# Patient Record
Sex: MALE | Race: OTHER | NOT HISPANIC OR LATINO
[De-identification: names, ages, dates, MRNs, and addresses within clinical notes are randomized per-mention and may not be internally consistent; named-entity substitution may affect disease eponyms.]

---

## 2018-06-12 ENCOUNTER — OTHER (OUTPATIENT)
Age: 68
End: 2018-06-12

## 2018-06-12 DIAGNOSIS — D64.9 ANEMIA, UNSPECIFIED: ICD-10-CM

## 2018-06-13 ENCOUNTER — OTHER (OUTPATIENT)
Age: 68
End: 2018-06-13

## 2018-06-14 ENCOUNTER — OUTPATIENT (OUTPATIENT)
Dept: OUTPATIENT SERVICES | Facility: HOSPITAL | Age: 68
LOS: 1 days | End: 2018-06-14
Payer: COMMERCIAL

## 2018-06-14 ENCOUNTER — APPOINTMENT (OUTPATIENT)
Dept: GASTROENTEROLOGY | Facility: HOSPITAL | Age: 68
End: 2018-06-14

## 2018-06-14 ENCOUNTER — RESULT REVIEW (OUTPATIENT)
Age: 68
End: 2018-06-14

## 2018-06-14 DIAGNOSIS — R10.9 UNSPECIFIED ABDOMINAL PAIN: ICD-10-CM

## 2018-06-14 DIAGNOSIS — D64.9 ANEMIA, UNSPECIFIED: ICD-10-CM

## 2018-06-14 PROCEDURE — 88305 TISSUE EXAM BY PATHOLOGIST: CPT

## 2018-06-14 PROCEDURE — 88305 TISSUE EXAM BY PATHOLOGIST: CPT | Mod: 26

## 2018-06-14 PROCEDURE — 43239 EGD BIOPSY SINGLE/MULTIPLE: CPT | Mod: GC

## 2018-06-14 PROCEDURE — 43239 EGD BIOPSY SINGLE/MULTIPLE: CPT

## 2018-06-14 PROCEDURE — 45378 DIAGNOSTIC COLONOSCOPY: CPT | Mod: GC

## 2018-06-14 PROCEDURE — 45378 DIAGNOSTIC COLONOSCOPY: CPT

## 2022-06-15 ENCOUNTER — APPOINTMENT (OUTPATIENT)
Dept: FAMILY MEDICINE | Facility: CLINIC | Age: 72
End: 2022-06-15
Payer: MEDICARE

## 2022-06-15 VITALS
SYSTOLIC BLOOD PRESSURE: 134 MMHG | TEMPERATURE: 97 F | WEIGHT: 185 LBS | OXYGEN SATURATION: 95 % | DIASTOLIC BLOOD PRESSURE: 75 MMHG | HEIGHT: 67 IN | BODY MASS INDEX: 29.03 KG/M2 | HEART RATE: 76 BPM

## 2022-06-15 DIAGNOSIS — Z86.39 PERSONAL HISTORY OF OTHER ENDOCRINE, NUTRITIONAL AND METABOLIC DISEASE: ICD-10-CM

## 2022-06-15 DIAGNOSIS — Z82.49 FAMILY HISTORY OF ISCHEMIC HEART DISEASE AND OTHER DISEASES OF THE CIRCULATORY SYSTEM: ICD-10-CM

## 2022-06-15 DIAGNOSIS — I25.10 ATHEROSCLEROTIC HEART DISEASE OF NATIVE CORONARY ARTERY W/OUT ANGINA PECTORIS: ICD-10-CM

## 2022-06-15 DIAGNOSIS — Z86.79 PERSONAL HISTORY OF OTHER DISEASES OF THE CIRCULATORY SYSTEM: ICD-10-CM

## 2022-06-15 DIAGNOSIS — Z82.3 FAMILY HISTORY OF STROKE: ICD-10-CM

## 2022-06-15 DIAGNOSIS — Z87.09 PERSONAL HISTORY OF OTHER DISEASES OF THE RESPIRATORY SYSTEM: ICD-10-CM

## 2022-06-15 PROCEDURE — 99213 OFFICE O/P EST LOW 20 MIN: CPT

## 2022-06-15 RX ORDER — POLYETHYLENE GLYCOL 3350 AND ELECTROLYTES WITH LEMON FLAVOR 236; 22.74; 6.74; 5.86; 2.97 G/4L; G/4L; G/4L; G/4L; G/4L
236 POWDER, FOR SOLUTION ORAL
Qty: 1 | Refills: 0 | Status: DISCONTINUED | COMMUNITY
Start: 2018-06-13 | End: 2022-06-15

## 2022-06-15 RX ORDER — ATORVASTATIN CALCIUM 80 MG/1
80 TABLET, FILM COATED ORAL DAILY
Qty: 90 | Refills: 1 | Status: ACTIVE | COMMUNITY
Start: 2022-06-15 | End: 1900-01-01

## 2022-06-15 RX ORDER — OMEGA-3/DHA/EPA/FISH OIL 300-1000MG
1000 CAPSULE ORAL
Qty: 180 | Refills: 1 | Status: ACTIVE | COMMUNITY
Start: 2022-06-15 | End: 1900-01-01

## 2022-06-16 RX ORDER — CLOPIDOGREL BISULFATE 75 MG/1
75 TABLET, FILM COATED ORAL DAILY
Qty: 90 | Refills: 1 | Status: ACTIVE | COMMUNITY
Start: 2022-06-15 | End: 1900-01-01

## 2022-06-16 RX ORDER — ASPIRIN ENTERIC COATED TABLETS 81 MG 81 MG/1
81 TABLET, DELAYED RELEASE ORAL DAILY
Qty: 90 | Refills: 1 | Status: ACTIVE | COMMUNITY
Start: 2022-06-16 | End: 1900-01-01

## 2022-06-16 RX ORDER — FLUTICASONE FUROATE AND VILANTEROL TRIFENATATE 200; 25 UG/1; UG/1
200-25 POWDER RESPIRATORY (INHALATION) DAILY
Qty: 3 | Refills: 3 | Status: DISCONTINUED | COMMUNITY
Start: 2022-06-15 | End: 2022-06-16

## 2022-06-16 NOTE — REVIEW OF SYSTEMS
[Chest Pain] : chest pain [Negative] : Musculoskeletal [Palpitations] : no palpitations [Claudication] : no  leg claudication [Lower Ext Edema] : no lower extremity edema [Orthopena] : no orthopnea [Paroxysmal Nocturnal Dyspnea] : no paroxysmal nocturnal dyspnea [FreeTextEntry5] : on exertion

## 2022-06-16 NOTE — HISTORY OF PRESENT ILLNESS
[de-identified] : Pt had two stents placed in April and May, 2022. Pt came back for followup. Pt still has left chest pain on exertion. Pt hasn't seen his cardiologist post stent placement. Pt requested all his medications to be renewed.

## 2022-06-29 DIAGNOSIS — R55 SYNCOPE AND COLLAPSE: ICD-10-CM

## 2022-06-29 DIAGNOSIS — J11.1 INFLUENZA DUE TO UNIDENTIFIED INFLUENZA VIRUS WITH OTHER RESPIRATORY MANIFESTATIONS: ICD-10-CM

## 2022-06-29 DIAGNOSIS — Z87.438 PERSONAL HISTORY OF OTHER DISEASES OF MALE GENITAL ORGANS: ICD-10-CM

## 2022-06-29 DIAGNOSIS — Z87.442 PERSONAL HISTORY OF URINARY CALCULI: ICD-10-CM

## 2022-06-29 DIAGNOSIS — Z86.39 PERSONAL HISTORY OF OTHER ENDOCRINE, NUTRITIONAL AND METABOLIC DISEASE: ICD-10-CM

## 2022-06-29 DIAGNOSIS — M79.2 NEURALGIA AND NEURITIS, UNSPECIFIED: ICD-10-CM

## 2022-06-29 DIAGNOSIS — Z87.09 PERSONAL HISTORY OF OTHER DISEASES OF THE RESPIRATORY SYSTEM: ICD-10-CM

## 2022-06-29 DIAGNOSIS — B02.9 ZOSTER W/OUT COMPLICATIONS: ICD-10-CM

## 2022-06-29 DIAGNOSIS — K21.9 GASTRO-ESOPHAGEAL REFLUX DISEASE W/OUT ESOPHAGITIS: ICD-10-CM

## 2022-06-29 DIAGNOSIS — L02.01 CUTANEOUS ABSCESS OF FACE: ICD-10-CM

## 2022-09-20 ENCOUNTER — APPOINTMENT (OUTPATIENT)
Dept: FAMILY MEDICINE | Facility: CLINIC | Age: 72
End: 2022-09-20

## 2022-09-20 VITALS
HEART RATE: 65 BPM | WEIGHT: 185 LBS | TEMPERATURE: 97 F | SYSTOLIC BLOOD PRESSURE: 142 MMHG | DIASTOLIC BLOOD PRESSURE: 76 MMHG | OXYGEN SATURATION: 97 % | BODY MASS INDEX: 28.98 KG/M2

## 2022-09-20 DIAGNOSIS — R51.9 HEADACHE, UNSPECIFIED: ICD-10-CM

## 2022-09-20 PROCEDURE — 90662 IIV NO PRSV INCREASED AG IM: CPT

## 2022-09-20 PROCEDURE — 99213 OFFICE O/P EST LOW 20 MIN: CPT | Mod: 25

## 2022-09-20 PROCEDURE — G0008: CPT

## 2022-09-20 RX ORDER — MONTELUKAST 10 MG/1
10 TABLET, FILM COATED ORAL DAILY
Qty: 90 | Refills: 1 | Status: DISCONTINUED | COMMUNITY
Start: 2022-06-16 | End: 2022-09-20

## 2022-09-20 RX ORDER — BUDESONIDE AND FORMOTEROL FUMARATE DIHYDRATE 160; 4.5 UG/1; UG/1
160-4.5 AEROSOL RESPIRATORY (INHALATION) TWICE DAILY
Qty: 3 | Refills: 1 | Status: DISCONTINUED | COMMUNITY
Start: 2022-06-16 | End: 2022-09-20

## 2022-09-20 RX ORDER — ALBUTEROL SULFATE 90 UG/1
108 (90 BASE) AEROSOL, METERED RESPIRATORY (INHALATION)
Refills: 0 | Status: COMPLETED | COMMUNITY
End: 2022-09-20

## 2022-09-20 NOTE — HISTORY OF PRESENT ILLNESS
[de-identified] : Pt came back for q 3 months follow up. Pt has been taking all his medications, needs Rxs for all medications and blood tests today.\par Pt has chronic COPD, CAD, hypertension, hyperlipidemia, iron deficiency anemia and vit D deficiency. Pt fell in 2016 and had head concussion. Ever since pt has been having chronic dizziness and headache. Pt has been followed with neurologist and had two CT of brain. Last CT of brain was in 2020, no significant changes. Pt wants to see another neurologist and pulmonologist because he is dissatisfied with his current ones. Pt still has sob on exertion, seen by cardiologist and pulmonologist recently, no change of medications.

## 2022-09-20 NOTE — REVIEW OF SYSTEMS
[Negative] : Heme/Lymph [Dyspnea on Exertion] : dyspnea on exertion [Headache] : headache [Dizziness] : dizziness [Unsteady Walk] : ataxia [Wheezing] : no wheezing [Cough] : no cough [Fainting] : no fainting [Confusion] : no confusion

## 2022-09-28 ENCOUNTER — APPOINTMENT (OUTPATIENT)
Dept: FAMILY MEDICINE | Facility: CLINIC | Age: 72
End: 2022-09-28

## 2022-09-28 ENCOUNTER — LABORATORY RESULT (OUTPATIENT)
Age: 72
End: 2022-09-28

## 2022-09-28 PROCEDURE — 36415 COLL VENOUS BLD VENIPUNCTURE: CPT

## 2022-09-29 LAB
25(OH)D3 SERPL-MCNC: 64.6 NG/ML
ALBUMIN SERPL ELPH-MCNC: 4.9 G/DL
ALP BLD-CCNC: 81 U/L
ALT SERPL-CCNC: 12 U/L
ANION GAP SERPL CALC-SCNC: 16 MMOL/L
APPEARANCE: CLEAR
AST SERPL-CCNC: 20 U/L
BASOPHILS # BLD AUTO: 0.07 K/UL
BASOPHILS NFR BLD AUTO: 1 %
BILIRUB SERPL-MCNC: 0.6 MG/DL
BILIRUBIN URINE: NEGATIVE
BLOOD URINE: NEGATIVE
BUN SERPL-MCNC: 11 MG/DL
CALCIUM SERPL-MCNC: 9.6 MG/DL
CHLORIDE SERPL-SCNC: 103 MMOL/L
CHOLEST SERPL-MCNC: 139 MG/DL
CO2 SERPL-SCNC: 22 MMOL/L
COLOR: COLORLESS
CREAT SERPL-MCNC: 1.18 MG/DL
EGFR: 66 ML/MIN/1.73M2
EOSINOPHIL # BLD AUTO: 0.22 K/UL
EOSINOPHIL NFR BLD AUTO: 3.3 %
ESTIMATED AVERAGE GLUCOSE: 143 MG/DL
FERRITIN SERPL-MCNC: 17 NG/ML
GLUCOSE QUALITATIVE U: NEGATIVE
GLUCOSE SERPL-MCNC: 87 MG/DL
HBA1C MFR BLD HPLC: 6.6 %
HCT VFR BLD CALC: 44.6 %
HDLC SERPL-MCNC: 41 MG/DL
HGB BLD-MCNC: 14.5 G/DL
IMM GRANULOCYTES NFR BLD AUTO: 0.1 %
IRON SATN MFR SERPL: 14 %
IRON SERPL-MCNC: 61 UG/DL
KETONES URINE: NEGATIVE
LDLC SERPL CALC-MCNC: 69 MG/DL
LEUKOCYTE ESTERASE URINE: ABNORMAL
LYMPHOCYTES # BLD AUTO: 2.74 K/UL
LYMPHOCYTES NFR BLD AUTO: 41 %
MAN DIFF?: NORMAL
MCHC RBC-ENTMCNC: 29.4 PG
MCHC RBC-ENTMCNC: 32.5 GM/DL
MCV RBC AUTO: 90.3 FL
MONOCYTES # BLD AUTO: 0.46 K/UL
MONOCYTES NFR BLD AUTO: 6.9 %
NEUTROPHILS # BLD AUTO: 3.19 K/UL
NEUTROPHILS NFR BLD AUTO: 47.7 %
NITRITE URINE: NEGATIVE
NONHDLC SERPL-MCNC: 98 MG/DL
PH URINE: 6.5
PLATELET # BLD AUTO: 200 K/UL
POTASSIUM SERPL-SCNC: 4.6 MMOL/L
PROT SERPL-MCNC: 7.5 G/DL
PROTEIN URINE: NEGATIVE
PSA SERPL-MCNC: 3.27 NG/ML
RBC # BLD: 4.94 M/UL
RBC # FLD: 14.7 %
SODIUM SERPL-SCNC: 140 MMOL/L
SPECIFIC GRAVITY URINE: 1
TIBC SERPL-MCNC: 449 UG/DL
TRIGL SERPL-MCNC: 141 MG/DL
TSH SERPL-ACNC: 1.6 UIU/ML
UIBC SERPL-MCNC: 388 UG/DL
URATE SERPL-MCNC: 3.4 MG/DL
UROBILINOGEN URINE: NORMAL
WBC # FLD AUTO: 6.69 K/UL

## 2022-10-05 ENCOUNTER — APPOINTMENT (OUTPATIENT)
Dept: FAMILY MEDICINE | Facility: CLINIC | Age: 72
End: 2022-10-05

## 2022-10-05 PROCEDURE — 99441: CPT

## 2022-10-05 NOTE — END OF VISIT
[FreeTextEntry3] : Pt was fully explained his diagnosis, treatment plan and goal of treatment today on the phone for 10 minutes\par

## 2022-10-05 NOTE — HISTORY OF PRESENT ILLNESS
[Home] : at home, [unfilled] , at the time of the visit. [Medical Office: ([facility])___] : at the medical office located in  [Verbal consent obtained from patient] : the patient, [unfilled] [de-identified] : HBA1c was 6.6, estimated glucose 143, ferritin 17, iron total binding capacity 449, iron saturation 14%. Pt was recalled back for abnormal test reports. Reports were reviewed with pt in details. Other blood tests came back wnl. Pt has been taking all his medications.\par \par \par

## 2022-11-29 ENCOUNTER — APPOINTMENT (OUTPATIENT)
Dept: FAMILY MEDICINE | Facility: CLINIC | Age: 72
End: 2022-11-29

## 2022-11-29 VITALS
TEMPERATURE: 98.2 F | WEIGHT: 185 LBS | SYSTOLIC BLOOD PRESSURE: 137 MMHG | BODY MASS INDEX: 29.03 KG/M2 | OXYGEN SATURATION: 99 % | RESPIRATION RATE: 16 BRPM | HEIGHT: 67 IN | DIASTOLIC BLOOD PRESSURE: 81 MMHG | HEART RATE: 66 BPM

## 2022-11-29 DIAGNOSIS — B00.9 HERPESVIRAL INFECTION, UNSPECIFIED: ICD-10-CM

## 2022-11-29 PROCEDURE — 99214 OFFICE O/P EST MOD 30 MIN: CPT

## 2022-11-29 PROCEDURE — 87804 INFLUENZA ASSAY W/OPTIC: CPT | Mod: 59,QW

## 2022-11-29 NOTE — PHYSICAL EXAM
[Normal TMs] : both tympanic membranes were normal [Normal] : no respiratory distress, lungs were clear to auscultation bilaterally and no accessory muscle use [de-identified] : Mild erythematous oropharynx, no significant tonsil enlargement and exudate. Right upper neck: small lymph node, tender. 0.2 cm blister on right side lower lip.

## 2022-11-29 NOTE — HISTORY OF PRESENT ILLNESS
[FreeTextEntry8] : Pt walked in today and complained sore throat, headache, coughing, painful bumps on right upper neck, right ear pain, and blister at lower lip since past 3 days. Pt felt chills too with low grade fever. No vomiting and diarrhea. Pt has COPD and used symbicort 160-4.5, 2 puffs bid. Pt denied wheezing and sob. Pt didn't do home covid test yet. \par

## 2022-11-30 PROBLEM — B00.9 HERPES SIMPLEX TYPE 1 INFECTION: Status: ACTIVE | Noted: 2022-11-30

## 2022-11-30 PROBLEM — B00.9 HERPES SIMPLEX TYPE 1 INFECTION: Status: RESOLVED | Noted: 2022-11-29 | Resolved: 2022-11-30

## 2022-12-02 ENCOUNTER — APPOINTMENT (OUTPATIENT)
Dept: PULMONOLOGY | Facility: CLINIC | Age: 72
End: 2022-12-02
Payer: MEDICARE

## 2022-12-02 VITALS
WEIGHT: 184 LBS | BODY MASS INDEX: 28.88 KG/M2 | OXYGEN SATURATION: 98 % | HEIGHT: 67 IN | HEART RATE: 72 BPM | SYSTOLIC BLOOD PRESSURE: 111 MMHG | TEMPERATURE: 97.6 F | DIASTOLIC BLOOD PRESSURE: 71 MMHG

## 2022-12-02 DIAGNOSIS — Z87.891 PERSONAL HISTORY OF NICOTINE DEPENDENCE: ICD-10-CM

## 2022-12-02 LAB
FLUAV SPEC QL CULT: NEGATIVE
FLUBV AG SPEC QL IA: NEGATIVE

## 2022-12-02 PROCEDURE — 99204 OFFICE O/P NEW MOD 45 MIN: CPT

## 2022-12-02 NOTE — PHYSICAL EXAM
[No Acute Distress] : no acute distress [Normal Oropharynx] : normal oropharynx [Normal Appearance] : normal appearance [No Neck Mass] : no neck mass [No JVD] : no jvd [Normal Rate/Rhythm] : normal rate/rhythm [Normal S1, S2] : normal s1, s2 [No Resp Distress] : no resp distress [Clear to Auscultation Bilaterally] : clear to auscultation bilaterally [No Abnormalities] : no abnormalities [Normal Gait] : normal gait [No Clubbing] : no clubbing [No Edema] : no edema [Normal Color/ Pigmentation] : normal color/ pigmentation [No Focal Deficits] : no focal deficits [Oriented x3] : oriented x3 [Normal Affect] : normal affect

## 2022-12-02 NOTE — HISTORY OF PRESENT ILLNESS
[Former] : former [>= 20 pack years] : >= 20 pack years [Never] : never [TextBox_4] : Referred by PMD Dr. Hillman for increasing dyspnea.\par \par 71yo man former 2ppd smoker (quit ~30yrs ago) w/ PMH sig for extensive CAD (s/p PCI, 5+ stents), COPD, anemia, HTN, and post-nasal drip presenting with c/o progressive GONZALEZ over the past few weeks to months to approx. 1 block that has begun to worry him. Previously was able to walk several blocks without limitation, now feels dyspneic with almost minimal exertion associated with chest tightness that radiates to the left axilla and sometimes his neck and back, that is also sometimes occasionally associated with cough of clear sputa. He sometimes feels chest tightness even at rest, and after showering he is so uncomfortable he has to sit upright and get his bearings. Sx are exacerbated lying flat or on left side. Sometimes wheezes during these episodes but also says he frequently feels dizzy and with palpitations as well when very dyspneic and has described almost "passing out" when they are at their worst. Pt also adds that he sometimes has abd pain or nausea too when he cannot catch his breath. Endorses post-nasal drip as a trigger for coughing, especially when lying down at night but also sometimes during day.\par \par He used to see a pulmonologist and describes having breathing tests done years ago when he was told he had COPD, but has not followed-up in at least 2 years. Cannot remember name of pulmonologist he previously saw. Also follows with cardiologist Dr. Guera Quintanilla ([facility]) but admits he also has not seen her in over a year. His COPD is maintained with Symbicort and PRN albuterol.  [TextBox_11] : 2 [TextBox_13] : 25 [YearQuit] : 1992

## 2022-12-02 NOTE — REVIEW OF SYSTEMS
[Postnasal Drip] : postnasal drip [Cough] : cough [Chest Tightness] : chest tightness [Dyspnea] : dyspnea [SOB on Exertion] : sob on exertion [Chest Discomfort] : chest discomfort [Leg Cramps] : leg cramps [Orthopnea] : orthopnea [Palpitations] : palpitations [Nausea] : nausea [Anemia] : anemia [Negative] : Endocrine [Hemoptysis] : no hemoptysis [Sputum] : no sputum [Vomiting] : no vomiting [TextBox_91] : leg and foot cramping

## 2022-12-02 NOTE — CONSULT LETTER
[Dear  ___] : Dear  [unfilled], [( Thank you for referring [unfilled] for consultation for _____ )] : Thank you for referring [unfilled] for consultation for [unfilled] [Please see my note below.] : Please see my note below. [Consult Closing:] : Thank you very much for allowing me to participate in the care of this patient.  If you have any questions, please do not hesitate to contact me. [Sincerely,] : Sincerely, [DrPérez  ___] : Dr. RODRIGUEZ [FreeTextEntry1] : I [FreeTextEntry3] : Faisal Rocha, DO\par Pulmonary & Critical Care Medicine\par [facility]

## 2022-12-02 NOTE — ASSESSMENT
[FreeTextEntry1] : 73yo man former 2ppd smoker (quit ~30yrs ago) w/ PMH sig for extensive CAD (s/p PCI, 5+ stents), COPD, anemia, HTN, and post-nasal drip presenting with c/o progressive GONZALEZ over the past few weeks to months to approx. 1 block associated w/ radiating chest tightness and occasionally near-syncope. \par \par Data Reviewed:\par Prior cardiology notes (scanned 2/2022)\par PMD notes, recent labs, EKG\par \par Impression:\par #COPD - not likely an exacerbation\par #Post-nasal drip, allergic rhinitis\par #Progressive GONZALEZ associated w/ chest tightness and occasional near-syncopal sx\par #Former smoker\par \par While his documented hx of COPD may play a role in dyspnea/GONZALEZ, it is concerning that his dyspnea is always associated with radiating chest tightness/pain and near-syncope in the context of extensive CAD hx and ACS risk factors, especially when they are relieved with rest/upright position and can be provoked with certain supine positions. Has not had recent pulmonary follow-up since the pandemic, and says he is over due to follow-up with his cardiologist. \par \par Plan:\par - suggested patient schedule follow-up visit to re-establish continued cardiology care for CAD, particularly given possibility he is having exertional angina more and more frequently \par - will obtain full PFT with 6MWT to establish new baseline for airflow limitation and gas exchange \par - recommended he continue using symbicort 2 puffs BID (with mouse rinsing after each dose), and PRN albuterol for when he has wheezing or feels tight - also has nebulizer machine at home he can use\par - will defer initiating Spiriva at this time pending PFT and additional cardiac mgmt as patient expressed concern over out of pocket cost of inhalers, though he may eventually need escalation to triple inhaled therapy which I counseled him about\par - advised he use flonase nasal sprays in each nostril twice a day for at least 2-4 wks for mgmt of post-nasal drip (said he was only using PRN and still had symptoms)\par - OTC cough suppressants as necessary \par - directions provided to [facility] pulmonary office for PFTs, \par - will have office visit note faxed to his cardiologist at pt's request\par \par Return in 6-8wks or after PFT

## 2022-12-30 ENCOUNTER — APPOINTMENT (OUTPATIENT)
Dept: PULMONOLOGY | Facility: CLINIC | Age: 72
End: 2022-12-30
Payer: MEDICARE

## 2022-12-30 VITALS
BODY MASS INDEX: 29.19 KG/M2 | TEMPERATURE: 98 F | DIASTOLIC BLOOD PRESSURE: 77 MMHG | WEIGHT: 186 LBS | OXYGEN SATURATION: 99 % | SYSTOLIC BLOOD PRESSURE: 138 MMHG | HEIGHT: 67 IN | HEART RATE: 70 BPM

## 2022-12-30 PROCEDURE — 94618 PULMONARY STRESS TESTING: CPT

## 2022-12-30 PROCEDURE — 94010 BREATHING CAPACITY TEST: CPT

## 2022-12-30 PROCEDURE — 94726 PLETHYSMOGRAPHY LUNG VOLUMES: CPT

## 2022-12-30 PROCEDURE — 94729 DIFFUSING CAPACITY: CPT

## 2022-12-30 PROCEDURE — ZZZZZ: CPT

## 2023-01-25 ENCOUNTER — NON-APPOINTMENT (OUTPATIENT)
Age: 73
End: 2023-01-25

## 2023-01-25 ENCOUNTER — APPOINTMENT (OUTPATIENT)
Dept: PULMONOLOGY | Facility: CLINIC | Age: 73
End: 2023-01-25

## 2023-01-27 ENCOUNTER — APPOINTMENT (OUTPATIENT)
Dept: PULMONOLOGY | Facility: CLINIC | Age: 73
End: 2023-01-27
Payer: MEDICARE

## 2023-01-27 VITALS
OXYGEN SATURATION: 98 % | BODY MASS INDEX: 29.19 KG/M2 | HEART RATE: 64 BPM | TEMPERATURE: 98.3 F | HEIGHT: 67 IN | RESPIRATION RATE: 15 BRPM | SYSTOLIC BLOOD PRESSURE: 124 MMHG | WEIGHT: 186 LBS | DIASTOLIC BLOOD PRESSURE: 70 MMHG

## 2023-01-27 PROCEDURE — 99215 OFFICE O/P EST HI 40 MIN: CPT

## 2023-01-27 RX ORDER — BUDESONIDE AND FORMOTEROL FUMARATE DIHYDRATE 160; 4.5 UG/1; UG/1
160-4.5 AEROSOL RESPIRATORY (INHALATION) TWICE DAILY
Qty: 1 | Refills: 3 | Status: DISCONTINUED | COMMUNITY
Start: 2022-06-16 | End: 2023-01-27

## 2023-01-27 NOTE — ASSESSMENT
[FreeTextEntry1] : 71yo man former 2ppd smoker (quit ~30yrs ago) w/ PMH sig for extensive CAD (s/p PCI, 5+ stents), COPD, GERD, anemia, HTN, and post-nasal drip presenting with significant coughing spells a/w progressive GONZALEZ over the past few weeks to months to approx. 1 block sometimes associated w/ radiating chest tightness and occasionally near-syncope. \par \par Data Reviewed:\par PFT (12/2022): FEV1 2.65L (106% predicted), FVC 3.44L (103% pred), FEV1/FVC 77%, TLC 93%, RV 78%, DLCO 63% - normal wilma and volumes, mild decr DLCO\par 6MWT (12/2022): 346 meters/360sec, pre SpO2 97% HR 70 138/77; post-SpO2 97%, HR 99 (peak 105), 160/70 no supplemental O2 required, \par Interval cardiology office note (1/17/23)\par TTE (12/2022, via cardiology note): HFpEF w/ EF 50-55%, mild dec ventricular systolic fxn, mild MR/TR, no e/o PH\par \par Impression:\par #COPD (questionable? given normal spirometry) \par #Post-nasal drip, allergic rhinitis\par #Chronic cough w/ associated dyspnea\par #GERD\par \par Pt lacks spirometric evidence of airflow obstruction on PFT, which is unusual if his symptoms were all attributable to underlying COPD. Cannot exclude reactive airways or even situational asthma, though the majority of his symptoms are coughing-mediated with some non-coughing related dyspnea/chest tightness and pre-syncope (when on exertion). Describes significant GERD and PND symptoms that can trigger cough via airways inflammation and warrants aggressive suppressive tx to assess for improvement in chronic cough and by extension, his associated chest tightness/fatigue, before further diagnostic testing. \par \par Plan:\par - will continue Breo trial in place of symbicort for now (switched by cardiology due to symbicort copay) - demonstrated proper Ellipta inhaler technique and education in office today, and provided 1 month sample to help with financial burden of $50 copay\par - reminded pt he must rinse mouth after each Breo inhalation\par - continue PRN albuterol for dyspnea/coughing/chest tightness, and suggested he pre-medicate if he is expecting a known trigger\par - re-instructed him to use flonase nasal sprays in each nostril daily for at least 2-4 wks for mgmt of post-nasal drip (pt still was only using PRN)\par - start pantoprazole daily at bedtime for GERD\par - OTC cough suppressants as necessary \par - will communicate updates with cardiology and appreciate concomitant eval for pre-syncopal sx\par - if coughing improves and still dyspneic on therapy, will consider exercise testing in the future\par \par Return in 3mo

## 2023-01-27 NOTE — REASON FOR VISIT
[Follow-Up] : a follow-up visit [Chest Pain] : chest pain [Cough] : cough [TextBox_13] : Dr. Rafia Hillman

## 2023-01-27 NOTE — HISTORY OF PRESENT ILLNESS
[Former] : former [>= 20 pack years] : >= 20 pack years [Never] : never [TextBox_4] : Referred by PMD Dr. Hillman for increasing dyspnea.\par \par 71yo man former 2ppd smoker (quit ~30yrs ago) w/ PMH sig for extensive CAD (s/p PCI, 5+ stents), COPD, anemia, HTN, and post-nasal drip presenting with c/o progressive GONZALEZ over the past few weeks to months to approx. 1 block that has begun to worry him. Previously was able to walk several blocks without limitation, now feels dyspneic with almost minimal exertion associated with chest tightness that radiates to the left axilla and sometimes his neck and back, that is also sometimes occasionally associated with cough of clear sputa. He sometimes feels chest tightness even at rest, and after showering he is so uncomfortable he has to sit upright and get his bearings. Sx are exacerbated lying flat or on left side. Sometimes wheezes during these episodes but also says he frequently feels dizzy and with palpitations as well when very dyspneic and has described almost "passing out" when they are at their worst. Pt also adds that he sometimes has abd pain or nausea too when he cannot catch his breath. Endorses post-nasal drip as a trigger for coughing, especially when lying down at night but also sometimes during day.\par \par He used to see a pulmonologist and describes having breathing tests done years ago when he was told he had COPD, but has not followed-up in at least 2 years. Cannot remember name of pulmonologist he previously saw. Also follows with cardiologist Dr. Guera Quintanilla ([facility]) but admits he also has not seen her in over a year. His COPD is maintained with Symbicort and PRN albuterol. \par \par [1/27/23]\par Here for follow up of coughing/dyspnea and to go over PFTs/6MWT. Still gets significant coughing spells with various triggers that he cannot always predict, but include laughing, exertion, household cleaning product odors, etc. that he says really set things off leading to some chest tightness and feeling faint. Sometimes gets the chest tightness and dizziness with exertion without coughing. Had seen his cardiologist Dr. Quintanilla in the interim and was changed from Symbicort to Breo due to high copay with the former. Also was given cardiac monitor to eval for occult arrhythmias. Also had f/u TTE done at cardiology appt. Pt otherwise does not feel his symptoms are getting worse, but haven't noticed much improvement yet. He has not started using the Breo but has the inhaler with him in office today for instruction. \par \par He also continues to experience significant reflux symptoms and PND which he definitely associates with coughing. Has not been on acid suppressive therapy a couple of months as he ran out of refills. Was only using nasal spray PRN and not every day for PND symptoms.  [TextBox_11] : 1992 [TextBox_13] : 25 [YearQuit] : 1992

## 2023-04-19 ENCOUNTER — APPOINTMENT (OUTPATIENT)
Dept: PULMONOLOGY | Facility: CLINIC | Age: 73
End: 2023-04-19
Payer: MEDICARE

## 2023-04-19 VITALS
BODY MASS INDEX: 29.51 KG/M2 | WEIGHT: 188 LBS | HEART RATE: 82 BPM | DIASTOLIC BLOOD PRESSURE: 77 MMHG | TEMPERATURE: 97 F | OXYGEN SATURATION: 98 % | HEIGHT: 67 IN | SYSTOLIC BLOOD PRESSURE: 127 MMHG

## 2023-04-19 PROCEDURE — 99214 OFFICE O/P EST MOD 30 MIN: CPT

## 2023-04-19 RX ORDER — ALBUTEROL SULFATE 90 UG/1
108 (90 BASE) INHALANT RESPIRATORY (INHALATION)
Qty: 1 | Refills: 5 | Status: ACTIVE | COMMUNITY
Start: 2022-06-15 | End: 1900-01-01

## 2023-04-19 RX ORDER — FLUTICASONE FUROATE AND VILANTEROL 100; 25 UG/1; UG/1
100-25 POWDER RESPIRATORY (INHALATION)
Qty: 1 | Refills: 5 | Status: ACTIVE | COMMUNITY

## 2023-04-19 NOTE — HISTORY OF PRESENT ILLNESS
[Former] : former [>= 20 pack years] : >= 20 pack years [Never] : never [TextBox_4] : Referred by PMD Dr. Hillman for increasing dyspnea.\par \par 73yo man former 2ppd smoker (quit ~30yrs ago) w/ PMH sig for extensive CAD (s/p PCI, 5+ stents), COPD, anemia, HTN, and post-nasal drip presenting with c/o progressive GONZALEZ over the past few weeks to months to approx. 1 block that has begun to worry him. Previously was able to walk several blocks without limitation, now feels dyspneic with almost minimal exertion associated with chest tightness that radiates to the left axilla and sometimes his neck and back, that is also sometimes occasionally associated with cough of clear sputa. He sometimes feels chest tightness even at rest, and after showering he is so uncomfortable he has to sit upright and get his bearings. Sx are exacerbated lying flat or on left side. Sometimes wheezes during these episodes but also says he frequently feels dizzy and with palpitations as well when very dyspneic and has described almost "passing out" when they are at their worst. Pt also adds that he sometimes has abd pain or nausea too when he cannot catch his breath. Endorses post-nasal drip as a trigger for coughing, especially when lying down at night but also sometimes during day.\par \par He used to see a pulmonologist and describes having breathing tests done years ago when he was told he had COPD, but has not followed-up in at least 2 years. Cannot remember name of pulmonologist he previously saw. Also follows with cardiologist Dr. Guera Quintanilla ([facility]) but admits he also has not seen her in over a year. His COPD is maintained with Symbicort and PRN albuterol. \par \par [1/27/23]\par Here for follow up of coughing/dyspnea and to go over PFTs/6MWT. Still gets significant coughing spells with various triggers that he cannot always predict, but include laughing, exertion, household cleaning product odors, etc. that he says really set things off leading to some chest tightness and feeling faint. Sometimes gets the chest tightness and dizziness with exertion without coughing. Had seen his cardiologist Dr. Quintanilla in the interim and was changed from Symbicort to Breo due to high copay with the former. Also was given cardiac monitor to eval for occult arrhythmias. Also had f/u TTE done at cardiology appt. Pt otherwise does not feel his symptoms are getting worse, but haven't noticed much improvement yet. He has not started using the Breo but has the inhaler with him in office today for instruction. \par \par He also continues to experience significant reflux symptoms and PND which he definitely associates with coughing. Has not been on acid suppressive therapy a couple of months as he ran out of refills. Was only using nasal spray PRN and not every day for PND symptoms. \par \par [4/19/23]\par Mr. Singh here for 3mo f/u. Over last couple of weeks been coughing more and feeling a bit more GONZALEZ -- had been doing well otherwise since last visit up until recently. Cough became productive of thick, yellowish phlegm last few days. Also has felt some fatigue. No F/C, no CP or palpitations, no wheezing. Main complaint is actually acid reflux. Says he coughs more at night when he gets reflux symptoms with heartburn up to pharnygeal area. This has been worse since he ran out of PPI for GERD and said there was an issue with his pharmacy not approving refills, asks if he can have new rx today. Has been using Breo as directed daily and PRN albuterol here and there. Also feels the nasal sprays have been helpful with PND. Saw cardiologist about a month or two ago and said some of his heart medications were adjusted. Says he is due to f/u with his primary doctor, Dr. Hillman.  [TextBox_11] : 1992 [TextBox_13] : 25 [YearQuit] : 1992

## 2023-04-19 NOTE — ASSESSMENT
[FreeTextEntry1] : 73yo man former 2ppd smoker (quit ~30yrs ago) w/ PMH sig for extensive CAD (s/p PCI, 5+ stents), COPD, GERD, anemia, HTN, and post-nasal drip presenting with significant coughing spells a/w progressive GONZALEZ over the past few weeks to months to approx. 1 block sometimes associated w/ radiating chest tightness and occasionally near-syncope. \par \par Data Reviewed:\par Medication reconciliation\par PFT/6MWT data 12/30\par \par Impression:\par #COPD (questionable? given normal spirometry, vs. stage 0) vs. ACOS\par #Post-nasal drip, allergic rhinitis\par #Acute on chronic cough w/ associated dyspnea\par #GERD\par \par Pt lacks spirometric evidence of airflow obstruction on PFT, which is unusual if his symptoms were all attributable to underlying COPD. Cannot exclude reactive airways or even situational asthma, though the majority of his symptoms are coughing-mediated with some non-coughing related dyspnea/chest tightness and pre-syncope (when on exertion). Describes significant GERD and PND symptoms that can trigger cough via airways inflammation and warrants aggressive suppressive tx - when on therapy his symptoms were improved but more recently (off PPI, for example) his reflux sx and nocturnal cough got worse. May have an element of viral bronchitis recently in addition to his background issues. \par \par Plan:\par - continue Breo for now as pt feels it has been helpful; renewed rx and confirmed coverage with pharmacy today\par - reminded pt he must rinse mouth after each Breo inhalation\par - continue PRN albuterol for dyspnea/coughing/chest tightness, and suggested he pre-medicate if he is expecting a known trigger or in the acute period with suspected viral cold\par - cont flonase for PND\par - renewed rx for pantoprazole for his GERD sx, but also advised pt he must f/u with his PMD for long term mgmt of what sounds like more severe reflux sx, may need to see GI again for surveillance endoscopy given his hx\par \par RTO 3-6mos based on progress

## 2023-04-19 NOTE — DISCUSSION/SUMMARY
[FreeTextEntry1] : PFT (12/2022): FEV1 2.65L (106% predicted), FVC 3.44L (103% pred), FEV1/FVC 77%, TLC 93%, RV 78%, DLCO 63% - normal wilma and volumes, mild decr DLCO\par \par 6MWT (12/2022): 346 meters/360sec, pre SpO2 97% HR 70 138/77; post-SpO2 97%, HR 99 (peak 105), 160/70 no supplemental O2 required\par \par TTE (12/2022, via cardiology note): HFpEF w/ EF 50-55%, mild dec ventricular systolic fxn, mild MR/TR, no e/o PH

## 2023-04-27 ENCOUNTER — NON-APPOINTMENT (OUTPATIENT)
Age: 73
End: 2023-04-27

## 2023-04-27 ENCOUNTER — APPOINTMENT (OUTPATIENT)
Dept: FAMILY MEDICINE | Facility: CLINIC | Age: 73
End: 2023-04-27
Payer: MEDICARE

## 2023-04-27 VITALS
BODY MASS INDEX: 28.72 KG/M2 | HEART RATE: 69 BPM | SYSTOLIC BLOOD PRESSURE: 126 MMHG | OXYGEN SATURATION: 97 % | WEIGHT: 183 LBS | DIASTOLIC BLOOD PRESSURE: 71 MMHG | HEIGHT: 67 IN | TEMPERATURE: 97.6 F

## 2023-04-27 DIAGNOSIS — M79.609 PAIN IN UNSPECIFIED LIMB: ICD-10-CM

## 2023-04-27 DIAGNOSIS — R20.0 ANESTHESIA OF SKIN: ICD-10-CM

## 2023-04-27 PROCEDURE — 93000 ELECTROCARDIOGRAM COMPLETE: CPT | Mod: 59

## 2023-04-27 PROCEDURE — G0009: CPT

## 2023-04-27 PROCEDURE — 90677 PCV20 VACCINE IM: CPT

## 2023-04-27 PROCEDURE — G0444 DEPRESSION SCREEN ANNUAL: CPT | Mod: 59

## 2023-04-27 PROCEDURE — G0439: CPT

## 2023-04-27 RX ORDER — NITROGLYCERIN 0.4 MG/1
0.4 TABLET SUBLINGUAL
Refills: 0 | Status: DISCONTINUED | COMMUNITY
Start: 2022-04-05 | End: 2023-04-27

## 2023-04-27 NOTE — HEALTH RISK ASSESSMENT
[Former] : Former [1/2 of Days or More (2)] : 5.) Poor appetite or overeating? Half the days or more [Nearly Every Day (3)] : 7.) Trouble concentrating on things, such as reading a newspaper or watching television? Nearly every day [Several Days (1)] : 8.) Moving or speaking so slowly that other people could have noticed, or the opposite, moving or speaking faster than usual? Several days [Not at All (0)] : 9.) Thoughts that you would be off dead or of hurting yourself in some way? Not at all [Moderately Severe] : severity of depression is moderately severe

## 2023-04-28 LAB
25(OH)D3 SERPL-MCNC: 27.2 NG/ML
ALBUMIN SERPL ELPH-MCNC: 4.7 G/DL
ALP BLD-CCNC: 84 U/L
ALT SERPL-CCNC: 12 U/L
ANION GAP SERPL CALC-SCNC: 14 MMOL/L
APPEARANCE: CLEAR
AST SERPL-CCNC: 20 U/L
BASOPHILS # BLD AUTO: 0.04 K/UL
BASOPHILS NFR BLD AUTO: 0.7 %
BILIRUB SERPL-MCNC: 0.8 MG/DL
BILIRUBIN URINE: NEGATIVE
BLOOD URINE: NEGATIVE
BUN SERPL-MCNC: 12 MG/DL
CALCIUM SERPL-MCNC: 9.7 MG/DL
CHLORIDE SERPL-SCNC: 106 MMOL/L
CHOLEST SERPL-MCNC: 129 MG/DL
CO2 SERPL-SCNC: 23 MMOL/L
COLOR: YELLOW
CREAT SERPL-MCNC: 1.13 MG/DL
CREAT SPEC-SCNC: 279 MG/DL
EGFR: 69 ML/MIN/1.73M2
EOSINOPHIL # BLD AUTO: 0.17 K/UL
EOSINOPHIL NFR BLD AUTO: 2.8 %
ESTIMATED AVERAGE GLUCOSE: 140 MG/DL
FERRITIN SERPL-MCNC: 11 NG/ML
GLUCOSE QUALITATIVE U: NEGATIVE MG/DL
GLUCOSE SERPL-MCNC: 81 MG/DL
HBA1C MFR BLD HPLC: 6.5 %
HCT VFR BLD CALC: 41.9 %
HDLC SERPL-MCNC: 42 MG/DL
HGB BLD-MCNC: 13.5 G/DL
IMM GRANULOCYTES NFR BLD AUTO: 0.2 %
IRON SATN MFR SERPL: 14 %
IRON SERPL-MCNC: 61 UG/DL
KETONES URINE: ABNORMAL MG/DL
LDLC SERPL CALC-MCNC: 64 MG/DL
LEUKOCYTE ESTERASE URINE: NEGATIVE
LYMPHOCYTES # BLD AUTO: 2.24 K/UL
LYMPHOCYTES NFR BLD AUTO: 36.4 %
MAN DIFF?: NORMAL
MCHC RBC-ENTMCNC: 27.6 PG
MCHC RBC-ENTMCNC: 32.2 GM/DL
MCV RBC AUTO: 85.7 FL
MICROALBUMIN 24H UR DL<=1MG/L-MCNC: 1.6 MG/DL
MICROALBUMIN/CREAT 24H UR-RTO: 6 MG/G
MONOCYTES # BLD AUTO: 0.5 K/UL
MONOCYTES NFR BLD AUTO: 8.1 %
NEUTROPHILS # BLD AUTO: 3.19 K/UL
NEUTROPHILS NFR BLD AUTO: 51.8 %
NITRITE URINE: NEGATIVE
NONHDLC SERPL-MCNC: 87 MG/DL
PH URINE: 6.5
PLATELET # BLD AUTO: 196 K/UL
POTASSIUM SERPL-SCNC: 4.3 MMOL/L
PROT SERPL-MCNC: 7.3 G/DL
PROTEIN URINE: NEGATIVE MG/DL
RBC # BLD: 4.89 M/UL
RBC # FLD: 15.9 %
SODIUM SERPL-SCNC: 143 MMOL/L
SPECIFIC GRAVITY URINE: 1.02
T3 SERPL-MCNC: 157 NG/DL
T3FREE SERPL-MCNC: 3.84 PG/ML
T4 FREE SERPL-MCNC: 1.2 NG/DL
T4 SERPL-MCNC: 8.8 UG/DL
TIBC SERPL-MCNC: 439 UG/DL
TRIGL SERPL-MCNC: 116 MG/DL
TSH SERPL-ACNC: 1.47 UIU/ML
UIBC SERPL-MCNC: 378 UG/DL
URATE SERPL-MCNC: 3.6 MG/DL
UROBILINOGEN URINE: 0.2 MG/DL
WBC # FLD AUTO: 6.15 K/UL

## 2023-04-28 NOTE — HISTORY OF PRESENT ILLNESS
[de-identified] : Pt came to office for annual physical exam today.\par Pt complained arms and legs numbness with upper arms throbbing pain and calf of legs cramping sometimes. Pt was referred to see neurologist for his dizziness, pt never went to see him.

## 2023-05-31 ENCOUNTER — APPOINTMENT (OUTPATIENT)
Dept: FAMILY MEDICINE | Facility: CLINIC | Age: 73
End: 2023-05-31
Payer: MEDICARE

## 2023-05-31 VITALS
HEIGHT: 67 IN | WEIGHT: 180 LBS | BODY MASS INDEX: 28.25 KG/M2 | SYSTOLIC BLOOD PRESSURE: 143 MMHG | DIASTOLIC BLOOD PRESSURE: 77 MMHG | OXYGEN SATURATION: 97 % | TEMPERATURE: 98.5 F | HEART RATE: 80 BPM

## 2023-05-31 PROCEDURE — 99213 OFFICE O/P EST LOW 20 MIN: CPT | Mod: 25

## 2023-05-31 PROCEDURE — 90750 HZV VACC RECOMBINANT IM: CPT

## 2023-05-31 PROCEDURE — 90471 IMMUNIZATION ADMIN: CPT

## 2023-05-31 NOTE — HISTORY OF PRESENT ILLNESS
[de-identified] : Pt took escitalopram 100 mg daily for a month. Pt came back for follow up. Pt didn't feel any differently, instead of feeling drowsy all the time. Pt is active in his [employer], in charge of a lot of things. Pt has been doing well. Pt didn't want to continue taking escitalopram anymore. \par Pt also came back for herpes zoster vaccine. \par

## 2023-09-29 ENCOUNTER — APPOINTMENT (OUTPATIENT)
Dept: FAMILY MEDICINE | Facility: CLINIC | Age: 73
End: 2023-09-29
Payer: MEDICARE

## 2023-09-29 VITALS
DIASTOLIC BLOOD PRESSURE: 76 MMHG | TEMPERATURE: 97.8 F | HEIGHT: 67 IN | HEART RATE: 69 BPM | BODY MASS INDEX: 28.72 KG/M2 | SYSTOLIC BLOOD PRESSURE: 126 MMHG | WEIGHT: 183 LBS | OXYGEN SATURATION: 98 %

## 2023-09-29 DIAGNOSIS — Z23 ENCOUNTER FOR IMMUNIZATION: ICD-10-CM

## 2023-09-29 DIAGNOSIS — R07.9 CHEST PAIN, UNSPECIFIED: ICD-10-CM

## 2023-09-29 PROCEDURE — 99214 OFFICE O/P EST MOD 30 MIN: CPT | Mod: 25

## 2023-09-29 PROCEDURE — 90662 IIV NO PRSV INCREASED AG IM: CPT

## 2023-09-29 PROCEDURE — G0008: CPT

## 2023-09-29 RX ORDER — AMLODIPINE BESYLATE 10 MG/1
10 TABLET ORAL DAILY
Qty: 90 | Refills: 1 | Status: ACTIVE | COMMUNITY
Start: 2022-06-14 | End: 1900-01-01

## 2023-10-02 ENCOUNTER — APPOINTMENT (OUTPATIENT)
Dept: GASTROENTEROLOGY | Facility: CLINIC | Age: 73
End: 2023-10-02
Payer: MEDICARE

## 2023-10-02 VITALS
WEIGHT: 183 LBS | HEIGHT: 67 IN | TEMPERATURE: 97.7 F | OXYGEN SATURATION: 97 % | DIASTOLIC BLOOD PRESSURE: 74 MMHG | SYSTOLIC BLOOD PRESSURE: 123 MMHG | HEART RATE: 67 BPM | BODY MASS INDEX: 28.72 KG/M2

## 2023-10-02 DIAGNOSIS — K62.5 HEMORRHAGE OF ANUS AND RECTUM: ICD-10-CM

## 2023-10-02 DIAGNOSIS — R10.84 GENERALIZED ABDOMINAL PAIN: ICD-10-CM

## 2023-10-02 DIAGNOSIS — R19.8 OTHER SPECIFIED SYMPTOMS AND SIGNS INVOLVING THE DIGESTIVE SYSTEM AND ABDOMEN: ICD-10-CM

## 2023-10-02 PROCEDURE — 99204 OFFICE O/P NEW MOD 45 MIN: CPT

## 2023-10-02 RX ORDER — HYDROCORTISONE 25 MG/G
2.5 CREAM TOPICAL
Qty: 1 | Refills: 3 | Status: ACTIVE | COMMUNITY
Start: 2023-10-02 | End: 1900-01-01

## 2023-10-02 RX ORDER — HYDROCORTISONE ACETATE 25 MG/1
25 SUPPOSITORY RECTAL
Qty: 30 | Refills: 3 | Status: ACTIVE | COMMUNITY
Start: 2023-10-02 | End: 1900-01-01

## 2023-12-21 ENCOUNTER — RX RENEWAL (OUTPATIENT)
Age: 73
End: 2023-12-21

## 2024-02-06 ENCOUNTER — TRANSCRIPTION ENCOUNTER (OUTPATIENT)
Age: 74
End: 2024-02-06

## 2024-02-06 ENCOUNTER — OUTPATIENT (OUTPATIENT)
Dept: OUTPATIENT SERVICES | Facility: HOSPITAL | Age: 74
LOS: 1 days | End: 2024-02-06
Payer: MEDICARE

## 2024-02-06 ENCOUNTER — RESULT REVIEW (OUTPATIENT)
Age: 74
End: 2024-02-06

## 2024-02-06 ENCOUNTER — APPOINTMENT (OUTPATIENT)
Dept: GASTROENTEROLOGY | Facility: HOSPITAL | Age: 74
End: 2024-02-06

## 2024-02-06 VITALS
RESPIRATION RATE: 18 BRPM | HEART RATE: 77 BPM | HEIGHT: 67 IN | WEIGHT: 184.97 LBS | SYSTOLIC BLOOD PRESSURE: 144 MMHG | DIASTOLIC BLOOD PRESSURE: 89 MMHG | OXYGEN SATURATION: 99 % | TEMPERATURE: 98 F

## 2024-02-06 VITALS
DIASTOLIC BLOOD PRESSURE: 84 MMHG | SYSTOLIC BLOOD PRESSURE: 130 MMHG | RESPIRATION RATE: 14 BRPM | HEART RATE: 73 BPM | OXYGEN SATURATION: 98 %

## 2024-02-06 DIAGNOSIS — K21.9 GASTRO-ESOPHAGEAL REFLUX DISEASE WITHOUT ESOPHAGITIS: ICD-10-CM

## 2024-02-06 DIAGNOSIS — Z98.890 OTHER SPECIFIED POSTPROCEDURAL STATES: Chronic | ICD-10-CM

## 2024-02-06 DIAGNOSIS — R19.8 OTHER SPECIFIED SYMPTOMS AND SIGNS INVOLVING THE DIGESTIVE SYSTEM AND ABDOMEN: ICD-10-CM

## 2024-02-06 DIAGNOSIS — Z95.1 PRESENCE OF AORTOCORONARY BYPASS GRAFT: Chronic | ICD-10-CM

## 2024-02-06 PROCEDURE — 45380 COLONOSCOPY AND BIOPSY: CPT | Mod: PT

## 2024-02-06 PROCEDURE — 43239 EGD BIOPSY SINGLE/MULTIPLE: CPT

## 2024-02-06 PROCEDURE — 88305 TISSUE EXAM BY PATHOLOGIST: CPT

## 2024-02-06 PROCEDURE — 88312 SPECIAL STAINS GROUP 1: CPT

## 2024-02-06 PROCEDURE — 88305 TISSUE EXAM BY PATHOLOGIST: CPT | Mod: 26

## 2024-02-06 PROCEDURE — 88312 SPECIAL STAINS GROUP 1: CPT | Mod: 26

## 2024-02-06 PROCEDURE — 45385 COLONOSCOPY W/LESION REMOVAL: CPT

## 2024-02-06 RX ORDER — SODIUM CHLORIDE 9 MG/ML
1000 INJECTION, SOLUTION INTRAVENOUS
Refills: 0 | Status: DISCONTINUED | OUTPATIENT
Start: 2024-02-06 | End: 2024-02-20

## 2024-02-06 RX ORDER — SODIUM CHLORIDE 9 MG/ML
500 INJECTION INTRAMUSCULAR; INTRAVENOUS; SUBCUTANEOUS
Refills: 0 | Status: COMPLETED | OUTPATIENT
Start: 2024-02-06 | End: 2024-02-06

## 2024-02-06 RX ADMIN — SODIUM CHLORIDE 30 MILLILITER(S): 9 INJECTION INTRAMUSCULAR; INTRAVENOUS; SUBCUTANEOUS at 12:13

## 2024-02-06 NOTE — ASU PREOP CHECKLIST - SITE MARKED BY SURGEON
Nutrition, metabolism, and development symptoms Nutrition, metabolism, and development symptoms Nutrition, metabolism, and development symptoms Nutrition, metabolism, and development symptoms n/a Nutrition, metabolism, and development symptoms

## 2024-02-06 NOTE — ASU PATIENT PROFILE, ADULT - NSICDXPASTSURGICALHX_GEN_ALL_CORE_FT
Dx: s/p reconstructive sx      Authorized # of Visits:  --         Next MD visit: none scheduled  Fall Risk: standard         Precautions: n/a           Post op 11    Subjective:  Reports saw surgeon. No need to wear brace except to sleep.  Not worried abou
PAST SURGICAL HISTORY:  H/O arthroscopy of left knee     H/O heart bypass surgery 7 STENTS-2015 AND 2019    History of back surgery

## 2024-02-06 NOTE — ASU PATIENT PROFILE, ADULT - FALL HARM RISK - UNIVERSAL INTERVENTIONS
Bed in lowest position, wheels locked, appropriate side rails in place/Call bell, personal items and telephone in reach/Instruct patient to call for assistance before getting out of bed or chair/Non-slip footwear when patient is out of bed/Wood to call system/Physically safe environment - no spills, clutter or unnecessary equipment/Purposeful Proactive Rounding/Room/bathroom lighting operational, light cord in reach

## 2024-02-06 NOTE — ASU PATIENT PROFILE, ADULT - NSICDXPASTMEDICALHX_GEN_ALL_CORE_FT
PAST MEDICAL HISTORY:  CAD (coronary atherosclerotic disease)     Chronic GERD     Hyperlipidemia     Hypertension

## 2024-02-06 NOTE — CHART NOTE - NSCHARTNOTEFT_GEN_A_CORE
History of Present Illness       The patient is a 73-year-old male with past medical history significant for hypertension, hypercholesterolemia and coronary artery disease, s/p cardiac stent placement x 7 on Clopidrogrel being followed by cardiologist, Dr. Flanagan who was referred to my office by Dr. Rafia Hillman for abdominal pain, dyspepsia, gastroesophageal reflux disease and atypical chest pain. The patient also admits to having alternating diarrhea/constipation, change in bowel habits, change in caliber of stool and rectal bleeding. I was asked to render an opinion for consultation for the above complaints. The patient states that he is feeling uncomfortable x 1 year. The patient was evaluated at [facility] emergency room on 2023 for elevated blood pressure, atypical chest pain and dizziness. The patient had blood work and imaging studies performed in the emergency room to assess the symptoms. The blood work performed on 2023 revealed a normal troponin 1 level of 14, 11, 14 ng/L, normal liver enzymes with a total bilirubin of 0.9 mg/dL, a normal alkaline phosphatase/AST/ALT of 73/23/15 U/L, respectively, a low CO2 of 21.1 mmol/L, a normal WBC count of 5.13 K/UL, mild anemia with a hemoglobin/hematocrit level of 12.2/38.3, respectively, a normal PTT/INR of 13.6/1.1/26.9, respectively. The chest x-ray performed on 2023 revealed surgical hardware and support devices with no focal consolidation or pulmonary edema and no evidence of pleural effusions or pneumothorax. The CT of the head without IV contrast performed on 2023 revealed no evidence of acute territorial infarction, intracranial hemorrhage or mass lesion. The patient was discharged in stable condition and advised to follow-up in the office for further work-up and treatment. The patient complains of abdominal pain. The patient describes the abdominal pain as a crampy, intermittent diffuse abdominal discomfort that occasionally radiates to the back. The abdominal pain is worse with meals and improves with passing gas or having a bowel movement. The abdominal pain is described as mild to moderate in nature. The abdominal pain occurs at night and in the morning. The abdominal pain can occur at any time. The abdominal pain never has awakened the patient from sleep. The abdominal pain is not relieved with any medications. The abdominal pain is associated with abdominal gas and bloating. The patient denies any nausea or vomiting. The patient complains of gastroesophageal reflux disease but denies any dysphagia. The gastroesophageal reflux disease is worse after meals and late at night and in the early morning. The gastroesophageal reflux disease is not improved with proton pump inhibitors, H2 blockers and antacids. The patient complains of atypical chest pain and shortness of breath but denies any palpitations. The chest pain is described as a sharp, intermittent substernal discomfort that is nonradiating in nature. The patient denies any diaphoresis. The chest pain is described as being 8 out of 10 in intensity. The chest pain can occur at any time. The chest pain is worse at night and early morning. The chest pain is worse after meals. The chest pain is unrelated to passing gas. The chest pain never awakened the patient from sleep. The patient complains of alternating diarrhea/constipation. The patient has 1 bowel movement a day. The diarrhea is described as soft to watery in nature. The patient complains of a change in bowel habits. The patient complains of a change in caliber of stool. The patient admits to having mucus discharge with the bowel movements. The patient complains of rectal bleeding but denies any melena or hematemesis. The rectal bleeding is associated with diarrhea and constipation and internal hemorrhoids. The patient denies any rectal pain or rectal pruritus. The patient denies any weight loss or anorexia. He denies any fevers or chills. The patient denies any jaundice or pruritus. The patient complains of occasional lower back pain. The patient admits to having a prior upper endoscopy and colonoscopy performed by another gastroenterologist. According to the patient, the upper endoscopic findings approximately 5 years ago was unknown to the patient. The colonoscopic findings approximately 5 years ago was also unknown to the patient. The patient admits to a family history of GI problems. The patient's father had a history of colon cancer.  ?  (+) prior smoking 2 PPD x 30 years, stopped in , (+) prior ETOH abuse stopped in , (+) prior IVDA stopped in   ?  Physical Exam:  General Appearance: Well developed, overweight, no acute distress  ENT: nose clear, ears unremarkable  Eyes: No enteric sclera, conjunctiva clear.  Neck: Supple, without masses  Respiratory: Coarse breath sounds bilaterally, no wheezing no rales or rhonchi  Cardiovascular: S1-S2 audible, no murmur, no rubs or gallops  GI: (+) BS, soft, tender diffusely, no rebound, no guarding, no masses  Liver: liver edge palpated  Rectal: not done  Musculo-skeletal: Good motor strength, good range of motion, normal appearing extremities  Skin: Normal appearing skin, no jaundice, no rashes or nodules  Neurological: without focal motor or sensory deficits Patient is moving all extremities spontaneously and to command with normal muscle strength alert and oriented X3  Psychiatric: Good affect, not depressed, not anxious  ?  Active Problems  Acute upper respiratory infection (465.9) (J06.9)  Allergic rhinitis (477.9) (J30.9)  Anemia (285.9) (D64.9)  Arteriosclerotic coronary artery disease (414.00) (I25.10)  Benign essential hypertension (401.1) (I10)  Chest pain (786.50) (R07.9)  Chronic cough (786.2) (R05.3)  COPD (chronic obstructive pulmonary disease) (496) (J44.9)  Depression (311) (F32.A)  Diabetes mellitus, type II (250.00) (E11.9)  Dizziness (780.4) (R42)  Encounter for vaccination (V05.9) (Z23)  Encounter for well adult exam with abnormal findings (V70.0) (Z00.01)  GERD (gastroesophageal reflux disease) (530.81) (K21.9)  Headache (784.0) (R51.9)  Herpes simplex type 1 infection (054.9) (B00.9)  Iron deficiency anemia due to sideropenic dysphagia (280.8) (D50.1)  Mixed hyperlipidemia (272.2) (E78.2)  Numbness of extremity (782.0) (R20.0)  Pain in extremity (729.5) (M79.609)  Post-nasal drip (784.91) (R09.82)  Vitamin D deficiency (268.9) (E55.9)       ?  Past Medical History  History of Coronary arteriosclerosis (414.00) (I25.10)  History of Cutaneous abscess of face (682.0) (L02.01)  History of Gastroesophageal reflux disease without esophagitis (530.81) (K21.9)  History of Herpes simplex type 1 infection (054.9) (B00.9)  History of chronic bronchitis (V12.69) (Z87.09)  History of chronic obstructive lung disease (V12.69) (Z87.09)  History of diabetes mellitus (V12.29) (Z86.39)  History of essential hypertension (V12.59) (Z86.79)  History of hyperlipidemia (V12.29) (Z86.39)  History of obesity (V12.29) (Z86.39)  History of sexual dysfunction in male (V13.89) (Z87.438)  History of urinary stone (V13.01) (Z87.442)  History of vitamin D deficiency (V12.1) (Z86.39)  History of Influenza due to unidentified influenza virus with other respiratory  manifestations (487.1) (J11.1)  History of Neuralgia and neuritis, unspecified (729.2) (M79.2)  History of Syncope and collapse (780.2) (R55)  History of Zoster without complications (053.9) (B02.9)       ?  Surgical History  History of PTCA  History of Rectal surgery       ?  Family History  Family history of Colon carcinoma : Father  Family history of  : Mother, Father  Family history of cerebrovascular accident (CVA) (V17.1) (Z82.3) : Mother  Family history of hypertension (V17.49) (Z82.49) : Mother       ?  Social History  Former smoker (V15.82) (Z87.891)  No alcohol use  No illicit drug use       ?  Current Meds  Albuterol Sulfate  (90 Base) MCG/ACT Inhalation Aerosol Solution; INHALE 1 TO  2 PUFFS EVERY 4 TO 6 HOURS AS NEEDED  amLODIPine Besylate 10 MG Oral Tablet; TAKE 1 TABLET DAILY  amLODIPine Besylate 5 MG Oral Tablet  Aspirin 81 MG Oral Tablet Delayed Release; TAKE 1 TABLET DAILY  Atorvastatin Calcium 80 MG Oral Tablet; TAKE 1 TABLET DAILY  Clopidogrel Bisulfate 75 MG Oral Tablet; TAKE 1 TABLET DAILY  Escitalopram Oxalate 10 MG Oral Tablet; TAKE 1 TABLET DAILY  Fish Oil 1000 MG Oral Capsule; Take 1 capsule twice daily  Fluticasone Furoate-Vilanterol 100-25 MCG/ACT Inhalation Aerosol Powder Breath  Activated; INHALE 1 PUFF DAILY AND RINSE MOUTH AFTER EACH DOSE  Fluticasone Propionate 50 MCG/ACT Nasal Suspension; USE 1 SPRAY IN EACH  NOSTRIL TWICE DAILY  Furosemide 20 MG Oral Tablet; Take 1 tablet daily  Meclizine HCl - 25 MG Oral Tablet  Metoprolol Tartrate 50 MG Oral Tablet; TAKE 1 TABLET DAILY AS DIRECTED  Pantoprazole Sodium 40 MG Oral Tablet Delayed Release; take 1 tablet at bedtime  valACYclovir HCl - 1 GM Oral Tablet; TAKE 1 TABLET EVERY 12 HOURS DAILY  Vitamin D (Ergocalciferol) 1.25 MG (27492 UT) Oral Capsule; TAKE 1 CAPSULE WEEKLY       Allergies  Penicillins       ?  Review of Systems          Constitutional: feeling tired.    Eyes:. br probably deficientlued vu=ision problems revisionr  ?  The ear nose and throat.    Cardiovascular: chest pain.    Gastrointestinal: constipation, diarrhea, heartburn, bleeding and bloating.    Genitourinary:. nocturia.    Musculoskeletal:. rthralgias.    Neurological: dizziness . headaches.    Psychiatric: anxiety.    Hematologic/Lymphatic: a tendency for easy bruising.    Constitutional, Eyes, ENT, Cardiovascular, Respiratory, Gastrointestinal, Genitourinary, Musculoskeletal, Integumentary, Neurological, Psychiatric, Endocrine and Heme/Lymph are otherwise negative.  ?  Vitals  Vital Signs  ?  Recorded: 2023 02:12PM  Systolic  123, RLE, Sitting  Diastolic  74, RLE, Sitting  Height  5 ft 7 in  Weight  183 lb  BMI Calculated  28.66 kg/m2  BSA Calculated  1.95 m2  Temperature  97.7 F, Temporal  Heart Rate  67  O2 Saturation  97 %, Room Air  FiO2 Flow Rate  0 L/min, Room Air       ?  Assessment  Abdominal pain, diffuse (789.00) (R10.84)  GERD (gastroesophageal reflux disease) (530.81) (K21.9)  Atypical chest pain (786.59) (R07.89)  Alternating constipation and diarrhea (787.99) (R19.8)  Rectal bleeding (569.3) (K62.5)  Abdominal Pain: The patient complains of abdominal pain. The patient is to avoid nonsteroidal anti-inflammatory drugs and aspirin. I recommend a trial of Phazyme 1 tablet PO 3 times a day for 3 months for the symptoms.  Dyspepsia: The patient complains of dyspeptic symptoms. The patient was advised to abide by an anti-gas (low FOD-MAP) diet. The patient was given a pamphlet for anti-gas (low FOD-MAP). The patient and I reviewed the anti-gas (low FOD-MAP) diet at length. The patient is to start on a trial of Simethicone one tablet 4 times a day p.r.n. abdominal pain and gas.  GERD: The patient was advised to avoid late-night meals and dietary indiscretions. The patient was advised to avoid fried and fatty foods. The patient was advised to abide by an anti-GERD diet. The patient was given a pamphlet for anti-GERD. The patient and I reviewed the anti-GERD diet at length. I recommend a trial of Sucralfate suspension 1 gram/10 cc 4 times a day x 3 months for the symptoms.  Nausea: The patient complains of nausea. If the symptoms of nausea persists, the patient may require a trial of Zofran 8 mg twice a day.  Atypical Chest Pain: The patient complains of atypical chest pain of unclear etiology. The patient was advised to follow up with the PMD and cardiologist regarding evaluation for the atypical chest pain. The patient was told of possible etiologies such as cardiac, pulmonary, GI, musculoskeletal, stress and other causes for the atypical chest pain. The patient agrees and will follow-up with the PMD and cardiologist.  Alternating Diarrhea/Constipation: The patient complains of alternating diarrhea/constipation. I recommend a low residue diet. The patient is to avoid fiber supplementation. The patient is to consider starting a trial of a probiotic such as Align once a day. The patient agreed and will follow-up to reassess the symptoms.  Rectal Bleeding: The patient had episodes of rectal bleeding. The etiology of the rectal bleeding is unclear. I recommend a trial of Anusol HC suppositories one per rectum QHS and Anusol HC 2.5% cream apply to affected area twice a day PRN hemorrhoidal bleeding or pain. I recommend a trial of Calmoseptine cream apply to affected area twice a day for rectal discomfort. I recommend Tucks pads for the hemorrhoids. I recommend starting Sitz baths twice a day for the hemorrhoids. I recommend avoid wearing tight underwear and use boxers. I recommend avoid touching the perineal area. I recommend a colonoscopy to assess the site of bleeding. The patient was told of the risks and benefits of the procedure. The patient was told of the risks of perforation, emergency surgery, bleeding, infections and missed lesions. The patient agreed and will schedule for the procedure. The patient is to be n.p.o. after midnight and bowel prep was given. The patient is to return for the procedure after cardiac clearance.  Colonoscopy: I recommend a colonoscopy to assess the symptoms and for colonic polyps. The patient was told of the risks and benefits of the procedure. The patient was told of the risks of perforation, emergency surgery, bleeding, infections and missed lesions. The patient is to be on a clear liquid diet the entire day prior to the procedure. The patient is to complete the entire prescribed bowel prep the day prior to the procedure as directed. The patient is told not to drive, drink alcohol, use recreational drugs, exercise, or work the day of the procedure. The patient was told of the need for an escort to accompany the patient home after the procedure. The patient is aware that the procedure may be cancelled if they fail to follow the directions. The patient agreed and will schedule for the procedure. The patient can take the antihypertensive medication with a sip of water one hour prior to the procedure. The patient is to hold the blood thinner medication for 5 days prior to the procedure. The patient is to be n.p.o. after midnight and bowel prep was given. The patient is to return for the procedure.  Upper Endoscopy: I recommend an upper endoscopy to assess for peptic ulcer disease versus esophagitis. The patient was told of the risks and benefits of the procedure. The patient was told of the risks of perforation, emergency surgery, bleeding, infections and missed lesions. The patient is told not to drive, drink alcohol, use recreational drugs, exercise, or work the day of the procedure. The patient was told of the need for an escort to accompany the patient home after the procedure. The patient is aware that the procedure may be cancelled if they fail to follow the directions. The patient agreed and will schedule for the procedure. The patient can take the antihypertensive medication with a sip of water one hour prior to the procedure. The patient is to be n.p.o. after midnight. The patient is to return for the procedure.  Follow-up: The patient is to follow-up in the office in 4 weeks to reassess the symptoms. The patient was told to call the office if any further problems.          ?  Plan  Alternating constipation and diarrhea  Start: PEG-3350/Electrolytes 236 GM Oral Solution Reconstituted; MIX AS DIRECTED  AND DRINK OVER 4 HOURS, START AT 4PM  Colonoscopy Diagnostic; Status:Hold For - Scheduling; Requested for:2023;  GERD (gastroesophageal reflux disease)  Start: Sucralfate 1 GM/10ML Oral Suspension; TAKE 2 TEASPOONSFUL BY MOUTH 4  TIMES A DAY  Upper GI Endoscopy; Status:Active; Requested for:2023;  Rectal bleeding  Start: Anusol-HC 25 MG Rectal Suppository; INSERT 1 SUPPOSITORY RECTALLY AT  BEDTIME AS NEEDED  Start: Hydrocortisone (Perianal) 2.5 % External Cream (Anusol-HC); INSERT 1  APPLICATORFUL RECTALLY AT BEDTIME AS NEEDED

## 2024-02-08 LAB — SURGICAL PATHOLOGY STUDY: SIGNIFICANT CHANGE UP

## 2024-03-27 PROBLEM — E78.5 HYPERLIPIDEMIA, UNSPECIFIED: Chronic | Status: ACTIVE | Noted: 2024-02-06

## 2024-03-27 PROBLEM — I25.10 ATHEROSCLEROTIC HEART DISEASE OF NATIVE CORONARY ARTERY WITHOUT ANGINA PECTORIS: Chronic | Status: ACTIVE | Noted: 2024-02-06

## 2024-03-27 PROBLEM — I10 ESSENTIAL (PRIMARY) HYPERTENSION: Chronic | Status: ACTIVE | Noted: 2024-02-06

## 2024-03-27 PROBLEM — K21.9 GASTRO-ESOPHAGEAL REFLUX DISEASE WITHOUT ESOPHAGITIS: Chronic | Status: ACTIVE | Noted: 2024-02-06

## 2024-04-08 ENCOUNTER — APPOINTMENT (OUTPATIENT)
Dept: FAMILY MEDICINE | Facility: CLINIC | Age: 74
End: 2024-04-08
Payer: MEDICARE

## 2024-04-08 VITALS
HEART RATE: 58 BPM | HEIGHT: 67 IN | DIASTOLIC BLOOD PRESSURE: 67 MMHG | WEIGHT: 193 LBS | RESPIRATION RATE: 16 BRPM | OXYGEN SATURATION: 96 % | BODY MASS INDEX: 30.29 KG/M2 | TEMPERATURE: 98 F | SYSTOLIC BLOOD PRESSURE: 117 MMHG

## 2024-04-08 DIAGNOSIS — R61 GENERALIZED HYPERHIDROSIS: ICD-10-CM

## 2024-04-08 DIAGNOSIS — J06.9 ACUTE UPPER RESPIRATORY INFECTION, UNSPECIFIED: ICD-10-CM

## 2024-04-08 LAB
BASOPHILS # BLD AUTO: 0.04 K/UL
BASOPHILS NFR BLD AUTO: 0.6 %
EOSINOPHIL # BLD AUTO: 0.25 K/UL
EOSINOPHIL NFR BLD AUTO: 3.7 %
HCT VFR BLD CALC: 38.3 %
HGB BLD-MCNC: 12.3 G/DL
IMM GRANULOCYTES NFR BLD AUTO: 0.1 %
LYMPHOCYTES # BLD AUTO: 2.77 K/UL
LYMPHOCYTES NFR BLD AUTO: 40.6 %
MAN DIFF?: NORMAL
MCHC RBC-ENTMCNC: 27.4 PG
MCHC RBC-ENTMCNC: 32.1 GM/DL
MCV RBC AUTO: 85.3 FL
MONOCYTES # BLD AUTO: 0.44 K/UL
MONOCYTES NFR BLD AUTO: 6.4 %
NEUTROPHILS # BLD AUTO: 3.32 K/UL
NEUTROPHILS NFR BLD AUTO: 48.6 %
PLATELET # BLD AUTO: 217 K/UL
RBC # BLD: 4.49 M/UL
RBC # FLD: 16 %
WBC # FLD AUTO: 6.83 K/UL

## 2024-04-08 PROCEDURE — 99213 OFFICE O/P EST LOW 20 MIN: CPT

## 2024-04-08 NOTE — ASSESSMENT
[FreeTextEntry1] :    PLAN: -Quantiferon ordered to r/o TB -CBC, Sputum culture, Chest X-ray ordered for CPOD vs TB vs other cause -Patient a hard stick- Next visit is CPE so at patient's request, if possible to draw today for PCP  -F/u w/ pulmonary for follow-up care regarding COPD  -f/u 1 week or prn if symptoms progress -Educated on ED precautions to take if chest pain, SOB, night sweats, N/V/D occur.

## 2024-04-08 NOTE — HISTORY OF PRESENT ILLNESS
[FreeTextEntry8] : 72y/o M presenting w/ c/o chronic cough w/ productive phlegm, night sweats, SOB on exertion with occasional dizziness. Patient denies fever, chills, chest pain, abdominal pain at this time. Patient has hx of chronic cough. Has intermittently used inhaler as prescribed. Patient has not tried anything to decrease discomfort.   -received prevnar 20 in 2023

## 2024-04-10 ENCOUNTER — APPOINTMENT (OUTPATIENT)
Dept: PULMONOLOGY | Facility: CLINIC | Age: 74
End: 2024-04-10
Payer: MEDICARE

## 2024-04-10 VITALS
DIASTOLIC BLOOD PRESSURE: 69 MMHG | HEIGHT: 67 IN | WEIGHT: 191 LBS | BODY MASS INDEX: 29.98 KG/M2 | OXYGEN SATURATION: 96 % | TEMPERATURE: 98 F | HEART RATE: 68 BPM | SYSTOLIC BLOOD PRESSURE: 126 MMHG

## 2024-04-10 LAB
BACTERIA SPT CULT: NORMAL
M TB IFN-G BLD-IMP: NEGATIVE
QUANTIFERON TB PLUS MITOGEN MINUS NIL: >10 IU/ML
QUANTIFERON TB PLUS NIL: 0.06 IU/ML
QUANTIFERON TB PLUS TB1 MINUS NIL: 0.08 IU/ML
QUANTIFERON TB PLUS TB2 MINUS NIL: 0.06 IU/ML

## 2024-04-10 PROCEDURE — 99214 OFFICE O/P EST MOD 30 MIN: CPT

## 2024-04-10 RX ORDER — AZELASTINE HYDROCHLORIDE 137 UG/1
0.1 SPRAY, METERED NASAL TWICE DAILY
Qty: 1 | Refills: 3 | Status: ACTIVE | COMMUNITY
Start: 2024-04-10 | End: 1900-01-01

## 2024-04-10 RX ORDER — POLYETHYLENE GLYCOL 3350 AND ELECTROLYTES WITH LEMON FLAVOR 236; 22.74; 6.74; 5.86; 2.97 G/4L; G/4L; G/4L; G/4L; G/4L
236 POWDER, FOR SOLUTION ORAL
Qty: 1 | Refills: 0 | Status: DISCONTINUED | COMMUNITY
Start: 2023-10-02 | End: 2024-04-10

## 2024-04-10 RX ORDER — FLUTICASONE PROPIONATE 50 UG/1
50 SPRAY, METERED NASAL TWICE DAILY
Qty: 1 | Refills: 3 | Status: ACTIVE | COMMUNITY
Start: 2022-06-16 | End: 1900-01-01

## 2024-04-10 RX ORDER — FUROSEMIDE 20 MG/1
20 TABLET ORAL
Qty: 90 | Refills: 1 | Status: DISCONTINUED | COMMUNITY
Start: 2022-06-14 | End: 2024-04-10

## 2024-04-10 NOTE — HISTORY OF PRESENT ILLNESS
[Former] : former [>= 20 pack years] : >= 20 pack years [Never] : never [TextBox_4] : ~age~yo man former 2ppd smoker (quit ~30yrs ago) w/ PMH sig for extensive CAD (s/p PCI, 5+ stents), COPD, anemia, HTN, and post-nasal drip presenting with c/o progressive GONZALEZ over the past few weeks to months to approx. 1 block that has begun to worry him. Previously was able to walk several blocks without limitation, now feels dyspneic with almost minimal exertion associated with chest tightness that radiates to the left axilla and sometimes his neck and back, that is also sometimes occasionally associated with cough of clear sputa. He sometimes feels chest tightness even at rest, and after showering he is so uncomfortable he has to sit upright and get his bearings. Sx are exacerbated lying flat or on left side. Sometimes wheezes during these episodes but also says he frequently feels dizzy and with palpitations as well when very dyspneic and has described almost "passing out" when they are at their worst. Pt also adds that he sometimes has abd pain or nausea too when he cannot catch his breath. Endorses post-nasal drip as a trigger for coughing, especially when lying down at night but also sometimes during day.  He used to see a pulmonologist and describes having breathing tests done years ago when he was told he had COPD, but has not followed-up in at least 2 years. Cannot remember name of pulmonologist he previously saw. Also follows with cardiologist Dr. Guera Quintanilla ([facility]) but admits he also has not seen her in over a year. His COPD is maintained with Symbicort and PRN albuterol.   [1/27/23] Here for follow up of coughing/dyspnea and to go over PFTs/6MWT. Still gets significant coughing spells with various triggers that he cannot always predict, but include laughing, exertion, household cleaning product odors, etc. that he says really set things off leading to some chest tightness and feeling faint. Sometimes gets the chest tightness and dizziness with exertion without coughing. Had seen his cardiologist Dr. Quintanilla in the interim and was changed from Symbicort to Breo due to high copay with the former. Also was given cardiac monitor to eval for occult arrhythmias. Also had f/u TTE done at cardiology appt. Pt otherwise does not feel his symptoms are getting worse, but haven't noticed much improvement yet. He has not started using the Breo but has the inhaler with him in office today for instruction.   He also continues to experience significant reflux symptoms and PND which he definitely associates with coughing. Has not been on acid suppressive therapy a couple of months as he ran out of refills. Was only using nasal spray PRN and not every day for PND symptoms.   [4/19/23] Mr. Singh here for 3mo f/u. Over last couple of weeks been coughing more and feeling a bit more GONZALEZ -- had been doing well otherwise since last visit up until recently. Cough became productive of thick, yellowish phlegm last few days. Also has felt some fatigue. No F/C, no CP or palpitations, no wheezing. Main complaint is actually acid reflux. Says he coughs more at night when he gets reflux symptoms with heartburn up to pharnygeal area. This has been worse since he ran out of PPI for GERD and said there was an issue with his pharmacy not approving refills, asks if he can have new rx today. Has been using Breo as directed daily and PRN albuterol here and there. Also feels the nasal sprays have been helpful with PND. Saw cardiologist about a month or two ago and said some of his heart medications were adjusted. Says he is due to f/u with his primary doctor, Dr. Hillman.   [4/10/24] Recommended to f/u by primary care. Has been coughing a lot for last few weeks, mostly dry, sometimes w/ yellow or brown phlegm. Also still c/o chest pain with exertion radiating toward left arm a/w dizziness when he goes one block. Was supposed to have stress test with cardiology this week, now rescheduled for the following week as they could not get IV access for NST. Endorses PND like sx. No wheezing. Does not feel inhalers help too much. Hasn't been using Symbicort since he didn't feel much relief. Had acute visit w/ primary care 4/8, labs and CXR performed.  [TextBox_11] : 1992 [TextBox_13] : 25 [YearQuit] : 1992

## 2024-04-10 NOTE — REVIEW OF SYSTEMS
[Postnasal Drip] : postnasal drip [Cough] : cough [Hemoptysis] : no hemoptysis [Chest Tightness] : chest tightness [Sputum] : sputum [Dyspnea] : no dyspnea [SOB on Exertion] : sob on exertion [Chest Discomfort] : chest discomfort [Orthopnea] : orthopnea [Anemia] : anemia [Negative] : Endocrine

## 2024-04-10 NOTE — ASSESSMENT
[FreeTextEntry1] : ~age~yo man former 2ppd smoker (quit ~30yrs ago) w/ PMH sig for extensive CAD (s/p PCI, 5+ stents), ?COPD, GERD, anemia, HTN, and post-nasal drip presenting with significant coughing spells a/w progressive GONZALEZ over the past few weeks to months to approx. 1 block sometimes associated w/ radiating chest tightness and occasionally near-syncope.  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 given normal spirometry, vs. stage 0) vs. ACOS #Post-nasal drip, allergic rhinitis #Acute on chronic cough w/ associated dyspnea  Plan: - told to restart using Flonase daily, and added Azelastine - albuterol MDI q4-6h PRN - hold off on controller inhaler for now, does not seem to be helping him and without spirometric evidence of COPD - strongly advised him to keep cardio appt and needs stress test; I still think he may end up needing diagnostic cath as his CP/dyspnea are out of proportion to normal wilma and he has extensive hx of obstructive CAD w/ 5 stents  --> ER precautions discussed if worsening CP/dyspnea especially at rest or not improving w/ rest - if cough not improving and stress test not revealing, then CT chest at next f/u in May - will also repeat PFTs regardless in May  RTO 1 month

## 2024-04-11 ENCOUNTER — NON-APPOINTMENT (OUTPATIENT)
Age: 74
End: 2024-04-11

## 2024-04-19 LAB
25(OH)D3 SERPL-MCNC: 26.2 NG/ML
ALBUMIN SERPL ELPH-MCNC: 4.5 G/DL
ALP BLD-CCNC: 69 U/L
ALT SERPL-CCNC: 18 U/L
ANION GAP SERPL CALC-SCNC: 16 MMOL/L
AST SERPL-CCNC: 23 U/L
BILIRUB SERPL-MCNC: 0.7 MG/DL
BUN SERPL-MCNC: 14 MG/DL
CALCIUM SERPL-MCNC: 9.2 MG/DL
CHLORIDE SERPL-SCNC: 105 MMOL/L
CHOLEST SERPL-MCNC: 107 MG/DL
CO2 SERPL-SCNC: 23 MMOL/L
CREAT SERPL-MCNC: 1.15 MG/DL
EGFR: 67 ML/MIN/1.73M2
ESTIMATED AVERAGE GLUCOSE: 140 MG/DL
FERRITIN SERPL-MCNC: 10 NG/ML
GGT SERPL-CCNC: 22 U/L
GLUCOSE SERPL-MCNC: 82 MG/DL
HBA1C MFR BLD HPLC: 6.5 %
HDLC SERPL-MCNC: 35 MG/DL
IRON SATN MFR SERPL: 10 %
IRON SERPL-MCNC: 41 UG/DL
LDLC SERPL CALC-MCNC: 52 MG/DL
NONHDLC SERPL-MCNC: 72 MG/DL
POTASSIUM SERPL-SCNC: 4.3 MMOL/L
PROT SERPL-MCNC: 7.1 G/DL
PSA SERPL-MCNC: 3.33 NG/ML
SODIUM SERPL-SCNC: 144 MMOL/L
TIBC SERPL-MCNC: 425 UG/DL
TRIGL SERPL-MCNC: 107 MG/DL
TSH SERPL-ACNC: 0.81 UIU/ML
UIBC SERPL-MCNC: 384 UG/DL
URATE SERPL-MCNC: 3.2 MG/DL

## 2024-04-24 ENCOUNTER — APPOINTMENT (OUTPATIENT)
Dept: PULMONOLOGY | Facility: CLINIC | Age: 74
End: 2024-04-24
Payer: MEDICARE

## 2024-04-24 VITALS
SYSTOLIC BLOOD PRESSURE: 149 MMHG | OXYGEN SATURATION: 98 % | WEIGHT: 191 LBS | HEART RATE: 67 BPM | HEIGHT: 67 IN | BODY MASS INDEX: 29.98 KG/M2 | DIASTOLIC BLOOD PRESSURE: 77 MMHG

## 2024-04-24 PROCEDURE — 94729 DIFFUSING CAPACITY: CPT

## 2024-04-24 PROCEDURE — 94726 PLETHYSMOGRAPHY LUNG VOLUMES: CPT

## 2024-04-24 PROCEDURE — 94010 BREATHING CAPACITY TEST: CPT

## 2024-05-09 PROBLEM — Z00.01 ENCOUNTER FOR WELL ADULT EXAM WITH ABNORMAL FINDINGS: Status: ACTIVE | Noted: 2023-04-23

## 2024-05-15 ENCOUNTER — APPOINTMENT (OUTPATIENT)
Dept: PULMONOLOGY | Facility: CLINIC | Age: 74
End: 2024-05-15
Payer: MEDICARE

## 2024-05-15 ENCOUNTER — APPOINTMENT (OUTPATIENT)
Dept: FAMILY MEDICINE | Facility: CLINIC | Age: 74
End: 2024-05-15
Payer: MEDICARE

## 2024-05-15 VITALS
TEMPERATURE: 97 F | WEIGHT: 192 LBS | HEIGHT: 67 IN | BODY MASS INDEX: 30.13 KG/M2 | HEART RATE: 74 BPM | OXYGEN SATURATION: 99 % | DIASTOLIC BLOOD PRESSURE: 74 MMHG | SYSTOLIC BLOOD PRESSURE: 135 MMHG

## 2024-05-15 DIAGNOSIS — K21.9 GASTRO-ESOPHAGEAL REFLUX DISEASE W/OUT ESOPHAGITIS: ICD-10-CM

## 2024-05-15 DIAGNOSIS — F32.A DEPRESSION, UNSPECIFIED: ICD-10-CM

## 2024-05-15 DIAGNOSIS — I10 ESSENTIAL (PRIMARY) HYPERTENSION: ICD-10-CM

## 2024-05-15 DIAGNOSIS — E55.9 VITAMIN D DEFICIENCY, UNSPECIFIED: ICD-10-CM

## 2024-05-15 DIAGNOSIS — R05.3 CHRONIC COUGH: ICD-10-CM

## 2024-05-15 DIAGNOSIS — R42 DIZZINESS AND GIDDINESS: ICD-10-CM

## 2024-05-15 DIAGNOSIS — Z00.01 ENCOUNTER FOR GENERAL ADULT MEDICAL EXAMINATION WITH ABNORMAL FINDINGS: ICD-10-CM

## 2024-05-15 DIAGNOSIS — E78.2 MIXED HYPERLIPIDEMIA: ICD-10-CM

## 2024-05-15 DIAGNOSIS — E11.9 TYPE 2 DIABETES MELLITUS W/OUT COMPLICATIONS: ICD-10-CM

## 2024-05-15 DIAGNOSIS — M79.646 PAIN IN UNSPECIFIED FINGER(S): ICD-10-CM

## 2024-05-15 DIAGNOSIS — D50.1 SIDEROPENIC DYSPHAGIA: ICD-10-CM

## 2024-05-15 DIAGNOSIS — R07.89 OTHER CHEST PAIN: ICD-10-CM

## 2024-05-15 DIAGNOSIS — I25.10 ATHEROSCLEROTIC HEART DISEASE OF NATIVE CORONARY ARTERY W/OUT ANGINA PECTORIS: ICD-10-CM

## 2024-05-15 DIAGNOSIS — J30.9 ALLERGIC RHINITIS, UNSPECIFIED: ICD-10-CM

## 2024-05-15 DIAGNOSIS — R09.82 POSTNASAL DRIP: ICD-10-CM

## 2024-05-15 DIAGNOSIS — J44.9 CHRONIC OBSTRUCTIVE PULMONARY DISEASE, UNSPECIFIED: ICD-10-CM

## 2024-05-15 LAB
CREAT SPEC-SCNC: 300 MG/DL
MICROALBUMIN 24H UR DL<=1MG/L-MCNC: <1.2 MG/DL
MICROALBUMIN/CREAT 24H UR-RTO: NORMAL MG/G
RHEUMATOID FACT SER QL: 12 IU/ML

## 2024-05-15 PROCEDURE — 99214 OFFICE O/P EST MOD 30 MIN: CPT

## 2024-05-15 PROCEDURE — G0444 DEPRESSION SCREEN ANNUAL: CPT

## 2024-05-15 PROCEDURE — G0439: CPT

## 2024-05-15 RX ORDER — MECLIZINE HYDROCHLORIDE 25 MG/1
25 TABLET ORAL
Qty: 15 | Refills: 3 | Status: ACTIVE | COMMUNITY
Start: 2023-09-24 | End: 1900-01-01

## 2024-05-15 RX ORDER — PANTOPRAZOLE 40 MG/1
40 TABLET, DELAYED RELEASE ORAL
Qty: 30 | Refills: 3 | Status: ACTIVE | COMMUNITY
Start: 2024-05-15

## 2024-05-15 RX ORDER — MULTIVITAMIN
TABLET ORAL
Qty: 90 | Refills: 1 | Status: ACTIVE | COMMUNITY
Start: 2024-05-15 | End: 1900-01-01

## 2024-05-15 RX ORDER — PANTOPRAZOLE 40 MG/1
40 TABLET, DELAYED RELEASE ORAL
Qty: 30 | Refills: 2 | Status: DISCONTINUED | COMMUNITY
Start: 2023-01-27 | End: 2024-05-15

## 2024-05-15 RX ORDER — CHROMIUM 200 MCG
25 MCG TABLET ORAL DAILY
Qty: 90 | Refills: 3 | Status: ACTIVE | COMMUNITY
Start: 2024-05-15 | End: 1900-01-01

## 2024-05-15 RX ORDER — AMLODIPINE BESYLATE 5 MG/1
5 TABLET ORAL
Qty: 30 | Refills: 0 | Status: DISCONTINUED | COMMUNITY
Start: 2023-09-24 | End: 2024-05-15

## 2024-05-15 RX ORDER — VALACYCLOVIR 1 G/1
1 TABLET, FILM COATED ORAL
Qty: 6 | Refills: 2 | Status: DISCONTINUED | COMMUNITY
Start: 2022-11-30 | End: 2024-05-15

## 2024-05-15 RX ORDER — METOPROLOL TARTRATE 100 MG/1
100 TABLET, FILM COATED ORAL DAILY
Qty: 90 | Refills: 0 | Status: ACTIVE | COMMUNITY
Start: 2022-06-15

## 2024-05-15 RX ORDER — ERGOCALCIFEROL 1.25 MG/1
1.25 MG CAPSULE, LIQUID FILLED ORAL
Qty: 13 | Refills: 1 | Status: DISCONTINUED | COMMUNITY
Start: 2023-04-28 | End: 2024-05-15

## 2024-05-15 RX ORDER — ESCITALOPRAM OXALATE 10 MG/1
10 TABLET ORAL DAILY
Qty: 30 | Refills: 2 | Status: DISCONTINUED | COMMUNITY
Start: 2023-04-27 | End: 2024-05-15

## 2024-05-15 RX ORDER — SUCRALFATE 1 G/10ML
1 SUSPENSION ORAL
Qty: 1200 | Refills: 1 | Status: DISCONTINUED | COMMUNITY
Start: 2023-10-02 | End: 2024-05-15

## 2024-05-15 NOTE — ASSESSMENT
[FreeTextEntry1] : ~age~yo man former 2ppd smoker (quit ~30yrs ago) w/ PMH sig for extensive CAD (s/p PCI, 5+ stents), ?COPD, GERD, anemia, HTN, and post-nasal drip following for coughing spells a/w progressive GONZALEZ over months  ~1 block sometimes associated w/ radiating chest tightness and occasionally near-syncope.  Data Reviewed: PFT (4/24/24) - normal wilma, volumes; mild red DLCO similar to prior PFT in 12/2022 PMD chart (Dr. Hillman, 5/15/24)  Impression: #Dyspnea; less consistent with COPD or asthma based on two PFTs showing normal spirometry  #Post-nasal drip, allergic rhinitis #Acute on chronic cough w/ associated chest pain  Both PFT from 12/23 and most recent study above show no evidence of obstructive lung disease or abnormal lung volumes. Together with pt's lack of symptomatic improvement on inhaled therapy make me question his diagnosis of COPD or even asthma. I cannot exclude a component of emphysema given his smoking history and mild DLCO reduction but will obtain CT imaging in the meantime. Instead I worry his profound GONZALEZ a/w dizziness/CP is more reflective of anginal sx especially given his extensive CAD. He was due for stress testing with cardiology but this has not yet happened as he forgot to schedule the appointment.   Plan: - check CT chest non-con given ongoing symptoms to r/o structural/parenchymal lung disease - cont trial of Flonase and Azelastine nasal sprays for PND; suggested he also add on Neti pot nasal irrigation 1-2x a day prior to nasal sprays  - albuterol MDI q4-6h PRN - will continue to hold any controller inhalers as they have not helped him and PFTs are not revealing airflow disease - again reiterated the importance of him maintaining cardio f/u and strongly advised him to schedule the stress test as he had been instructed to do so back in April --> ER precautions discussed if worsening CP/dyspnea especially at rest or not improving w/ rest

## 2024-05-15 NOTE — HISTORY OF PRESENT ILLNESS
[de-identified] : 73 years old male has  past medical history of hypertension, hyperlipidemia, diabetes, CAD, GERD, iron deficiency anemia, and allergic rhinitis, came to office for annual physical exam. Pt complained 1, both hands difficulty to make a fist due to pain, no injury. HBA1c was 6.5, vitamin D 26.2, HGB 12.3, HCT 38.3, RDW 16, ferritin 10, HDL 35. Reports were reviewed with pt in details. Other blood tests came back wnl. Pt has been taking all his medications.

## 2024-05-15 NOTE — HISTORY OF PRESENT ILLNESS
[Former] : former [>= 20 pack years] : >= 20 pack years [Never] : never [TextBox_4] : ~age~yo man former 2ppd smoker (quit ~30yrs ago) w/ PMH sig for extensive CAD (s/p PCI, 5+ stents), COPD, anemia, HTN, and post-nasal drip presenting with c/o progressive GONZALEZ over the past few weeks to months to approx. 1 block that has begun to worry him. Previously was able to walk several blocks without limitation, now feels dyspneic with almost minimal exertion associated with chest tightness that radiates to the left axilla and sometimes his neck and back, that is also sometimes occasionally associated with cough of clear sputa. He sometimes feels chest tightness even at rest, and after showering he is so uncomfortable he has to sit upright and get his bearings. Sx are exacerbated lying flat or on left side. Sometimes wheezes during these episodes but also says he frequently feels dizzy and with palpitations as well when very dyspneic and has described almost "passing out" when they are at their worst. Pt also adds that he sometimes has abd pain or nausea too when he cannot catch his breath. Endorses post-nasal drip as a trigger for coughing, especially when lying down at night but also sometimes during day.  He used to see a pulmonologist and describes having breathing tests done years ago when he was told he had COPD, but has not followed-up in at least 2 years. Cannot remember name of pulmonologist he previously saw. Also follows with cardiologist Dr. Guera Quintanilla ([facility]) but admits he also has not seen her in over a year. His COPD is maintained with Symbicort and PRN albuterol.   [1/27/23] Here for follow up of coughing/dyspnea and to go over PFTs/6MWT. Still gets significant coughing spells with various triggers that he cannot always predict, but include laughing, exertion, household cleaning product odors, etc. that he says really set things off leading to some chest tightness and feeling faint. Sometimes gets the chest tightness and dizziness with exertion without coughing. Had seen his cardiologist Dr. Quintanilla in the interim and was changed from Symbicort to Breo due to high copay with the former. Also was given cardiac monitor to eval for occult arrhythmias. Also had f/u TTE done at cardiology appt. Pt otherwise does not feel his symptoms are getting worse, but haven't noticed much improvement yet. He has not started using the Breo but has the inhaler with him in office today for instruction.   He also continues to experience significant reflux symptoms and PND which he definitely associates with coughing. Has not been on acid suppressive therapy a couple of months as he ran out of refills. Was only using nasal spray PRN and not every day for PND symptoms.   [4/19/23] Mr. Singh here for 3mo f/u. Over last couple of weeks been coughing more and feeling a bit more GONZALEZ -- had been doing well otherwise since last visit up until recently. Cough became productive of thick, yellowish phlegm last few days. Also has felt some fatigue. No F/C, no CP or palpitations, no wheezing. Main complaint is actually acid reflux. Says he coughs more at night when he gets reflux symptoms with heartburn up to pharnygeal area. This has been worse since he ran out of PPI for GERD and said there was an issue with his pharmacy not approving refills, asks if he can have new rx today. Has been using Breo as directed daily and PRN albuterol here and there. Also feels the nasal sprays have been helpful with PND. Saw cardiologist about a month or two ago and said some of his heart medications were adjusted. Says he is due to f/u with his primary doctor, Dr. Hillman.   [4/10/24] Recommended to f/u by primary care. Has been coughing a lot for last few weeks, mostly dry, sometimes w/ yellow or brown phlegm. Also still c/o chest pain with exertion radiating toward left arm a/w dizziness when he goes one block. Was supposed to have stress test with cardiology this week, now rescheduled for the following week as they could not get IV access for NST. Endorses PND like sx. No wheezing. Does not feel inhalers help too much. Hasn't been using Symbicort since he didn't feel much relief. Had acute visit w/ primary care 4/8, labs and CXR performed.   [5/15/24] Scheduled f/u. Still coughing, says the nasal sprays have only been nominally helpful -- may forget to do them sometimes but usually does twice a day as instructed. Still also with profound GONZALEZ a/w dizziness. Also describes claudication symptoms of lower extremities, particularly the right. Never had stress test with cardiologist as was planned when we last met in April -- says he needs to call and set it up. Had PFT a few weeks ago between appointments. No new symptoms otherwise and prior sx have not worsened.  [TextBox_11] : 1992 [TextBox_13] : 25 [YearQuit] : 1992

## 2024-05-15 NOTE — HEALTH RISK ASSESSMENT
[Several Days (1)] : 3.) Trouble falling asleep, or sleeping too much? Several days [Nearly Every Day (3)] : 7.) Trouble concentrating on things, such as reading a newspaper or watching television? Nearly every day [1/2 of Days or More (2)] : 8.) Moving or speaking so slowly that other people could have noticed, or the opposite, moving or speaking faster than usual? Half the days or more [Not at All (0)] : 9.) Thoughts that you would be off dead or of hurting yourself in some way? Not at all [PHQ-9 Positive] : PHQ-9 Positive [I have developed a follow-up plan documented below in the note.] : I have developed a follow-up plan documented below in the note. [SLF7Cymbi] : 12 [[id number]] : 12 [EyeExamDate] : 2023 [ColonoscopyDate] : 2/24 [AdvancecareDate] : 05/15/24 [de-identified] : over 30 years ago

## 2024-05-15 NOTE — REVIEW OF SYSTEMS
[Postnasal Drip] : postnasal drip [Cough] : cough [Chest Tightness] : chest tightness [Sputum] : sputum [SOB on Exertion] : sob on exertion [Chest Discomfort] : chest discomfort [Orthopnea] : orthopnea [Anemia] : anemia [Negative] : Endocrine [Hemoptysis] : no hemoptysis [Dyspnea] : no dyspnea

## 2024-05-15 NOTE — DISCUSSION/SUMMARY
[FreeTextEntry1] : CXR PA 1-view ([facility], 4/9/24) - no obvious consolidation or effusion  PFT (12/2022): FEV1 2.65L (106% predicted), FVC 3.44L (103% pred), FEV1/FVC 77%, TLC 93%, RV 78%, DLCO 63% - normal wilma and volumes, mild decr DLCO  6MWT (12/2022): 346 meters/360sec, pre SpO2 97% HR 70 138/77; post-SpO2 97%, HR 99 (peak 105), 160/70 no supplemental O2 required  TTE (12/2022, via cardiology note): HFpEF w/ EF 50-55%, mild dec ventricular systolic fxn, mild MR/TR, no e/o PH

## 2024-05-15 NOTE — REASON FOR VISIT
[Follow-Up] : a follow-up visit [Cough] : cough [Shortness of Breath] : shortness of breath [TextBox_13] : Dr. Rafia Hillman

## 2024-05-20 NOTE — END OF VISIT
[FreeTextEntry3] : EKG was unremarkable today. Pt has been followed with cardiologist regularly. Last visit was in 2 months ago.  27-Apr-2024 07:06

## 2024-05-22 ENCOUNTER — APPOINTMENT (OUTPATIENT)
Dept: CT IMAGING | Facility: CLINIC | Age: 74
End: 2024-05-22
Payer: MEDICARE

## 2024-05-22 PROCEDURE — 71250 CT THORAX DX C-: CPT

## 2024-05-23 LAB
ANA PAT FLD IF-IMP: ABNORMAL
ANACR T: ABNORMAL
DSDNA AB SER-ACNC: 1 IU/ML

## 2024-06-12 ENCOUNTER — APPOINTMENT (OUTPATIENT)
Dept: CARDIOLOGY | Facility: CLINIC | Age: 74
End: 2024-06-12

## 2024-07-28 PROBLEM — D50.9 IRON DEFICIENCY ANEMIA: Status: ACTIVE | Noted: 2024-07-28

## 2024-07-31 ENCOUNTER — APPOINTMENT (OUTPATIENT)
Dept: FAMILY MEDICINE | Facility: CLINIC | Age: 74
End: 2024-07-31
Payer: MEDICARE

## 2024-07-31 VITALS
HEIGHT: 67 IN | SYSTOLIC BLOOD PRESSURE: 103 MMHG | TEMPERATURE: 98.1 F | BODY MASS INDEX: 29.82 KG/M2 | DIASTOLIC BLOOD PRESSURE: 64 MMHG | OXYGEN SATURATION: 98 % | WEIGHT: 190 LBS | HEART RATE: 61 BPM

## 2024-07-31 DIAGNOSIS — M79.646 PAIN IN UNSPECIFIED FINGER(S): ICD-10-CM

## 2024-07-31 DIAGNOSIS — J30.9 ALLERGIC RHINITIS, UNSPECIFIED: ICD-10-CM

## 2024-07-31 DIAGNOSIS — I25.10 ATHEROSCLEROTIC HEART DISEASE OF NATIVE CORONARY ARTERY W/OUT ANGINA PECTORIS: ICD-10-CM

## 2024-07-31 DIAGNOSIS — M54.50 LOW BACK PAIN, UNSPECIFIED: ICD-10-CM

## 2024-07-31 DIAGNOSIS — K21.9 GASTRO-ESOPHAGEAL REFLUX DISEASE W/OUT ESOPHAGITIS: ICD-10-CM

## 2024-07-31 DIAGNOSIS — I10 ESSENTIAL (PRIMARY) HYPERTENSION: ICD-10-CM

## 2024-07-31 DIAGNOSIS — J44.9 CHRONIC OBSTRUCTIVE PULMONARY DISEASE, UNSPECIFIED: ICD-10-CM

## 2024-07-31 PROCEDURE — G2211 COMPLEX E/M VISIT ADD ON: CPT | Mod: NC

## 2024-07-31 PROCEDURE — 99214 OFFICE O/P EST MOD 30 MIN: CPT | Mod: 25

## 2024-07-31 RX ORDER — LIDOCAINE 5% 700 MG/1
5 PATCH TOPICAL
Qty: 90 | Refills: 3 | Status: ACTIVE | COMMUNITY
Start: 2024-07-31 | End: 1900-01-01

## 2024-07-31 RX ORDER — GABAPENTIN 100 MG/1
100 CAPSULE ORAL
Qty: 60 | Refills: 3 | Status: ACTIVE | COMMUNITY
Start: 2024-07-31 | End: 1900-01-01

## 2024-07-31 NOTE — PHYSICAL EXAM
[Normal] : normal gait, coordination grossly intact, no focal deficits and deep tendon reflexes were 2+ and symmetric [No Spinal Tenderness] : no spinal tenderness

## 2024-07-31 NOTE — HISTORY OF PRESENT ILLNESS
[de-identified] : 73 years old male has past medical history of hypertension, hyperlipidemia, diabetes, CAD, GERD, iron deficiency anemia, and allergic rhinitis, came to office for follow up. Pt again complained low back pain, radiating to left hip area, hands stiffness in the morning. RF, anti-double strength DNA and NAFISA were negative in 3/24. Pt took tylenol that didn't help much.

## 2024-07-31 NOTE — HISTORY OF PRESENT ILLNESS
[de-identified] : 73 years old male has past medical history of hypertension, hyperlipidemia, diabetes, CAD, GERD, iron deficiency anemia, and allergic rhinitis, came to office for follow up. Pt again complained low back pain, radiating to left hip area, hands stiffness in the morning. RF, anti-double strength DNA and NAFISA were negative in 3/24. Pt took tylenol that didn't help much.

## 2024-08-07 PROBLEM — N28.9 ABNORMAL KIDNEY FUNCTION: Status: ACTIVE | Noted: 2024-08-07

## 2024-08-14 ENCOUNTER — APPOINTMENT (OUTPATIENT)
Dept: FAMILY MEDICINE | Facility: CLINIC | Age: 74
End: 2024-08-14
Payer: MEDICARE

## 2024-08-14 VITALS — SYSTOLIC BLOOD PRESSURE: 114 MMHG | TEMPERATURE: 98.1 F | HEIGHT: 67 IN | DIASTOLIC BLOOD PRESSURE: 65 MMHG

## 2024-08-14 DIAGNOSIS — E78.2 MIXED HYPERLIPIDEMIA: ICD-10-CM

## 2024-08-14 DIAGNOSIS — D50.9 IRON DEFICIENCY ANEMIA, UNSPECIFIED: ICD-10-CM

## 2024-08-14 DIAGNOSIS — N28.9 DISORDER OF KIDNEY AND URETER, UNSPECIFIED: ICD-10-CM

## 2024-08-14 DIAGNOSIS — E11.9 TYPE 2 DIABETES MELLITUS W/OUT COMPLICATIONS: ICD-10-CM

## 2024-08-14 DIAGNOSIS — E55.9 VITAMIN D DEFICIENCY, UNSPECIFIED: ICD-10-CM

## 2024-08-14 PROCEDURE — 99214 OFFICE O/P EST MOD 30 MIN: CPT

## 2024-08-14 PROCEDURE — G2211 COMPLEX E/M VISIT ADD ON: CPT

## 2024-08-14 RX ORDER — DAPAGLIFLOZIN 10 MG/1
10 TABLET, FILM COATED ORAL DAILY
Qty: 30 | Refills: 3 | Status: ACTIVE | COMMUNITY
Start: 2024-08-14 | End: 1900-01-01

## 2024-08-14 NOTE — HISTORY OF PRESENT ILLNESS
[de-identified] : 73 years old male has past medical history of hypertension, hyperlipidemia, diabetes, CAD, GERD, iron deficiency anemia, and allergic rhinitis, came back to review his most recent test results. Ferritin was 11, serum iron 41, iron sat 10%, HGB/HCT wnl, creatinine 1.39, GFR 53, fasting glucose 103, HBA1c 6.7, HDL 39, and UA positive for >=1000 glucose. Reports were reviewed with pt in details. Other blood tests came back wnl.

## 2024-08-14 NOTE — HISTORY OF PRESENT ILLNESS
[de-identified] : 73 years old male has past medical history of hypertension, hyperlipidemia, diabetes, CAD, GERD, iron deficiency anemia, and allergic rhinitis, came back to review his most recent test results. Ferritin was 11, serum iron 41, iron sat 10%, HGB/HCT wnl, creatinine 1.39, GFR 53, fasting glucose 103, HBA1c 6.7, HDL 39, and UA positive for >=1000 glucose. Reports were reviewed with pt in details. Other blood tests came back wnl.

## 2024-08-21 ENCOUNTER — APPOINTMENT (OUTPATIENT)
Dept: PULMONOLOGY | Facility: CLINIC | Age: 74
End: 2024-08-21
Payer: MEDICARE

## 2024-08-21 VITALS
HEART RATE: 60 BPM | HEIGHT: 67 IN | SYSTOLIC BLOOD PRESSURE: 119 MMHG | BODY MASS INDEX: 30.29 KG/M2 | RESPIRATION RATE: 15 BRPM | OXYGEN SATURATION: 98 % | DIASTOLIC BLOOD PRESSURE: 68 MMHG | TEMPERATURE: 98.1 F | WEIGHT: 193 LBS

## 2024-08-21 DIAGNOSIS — J43.2 CENTRILOBULAR EMPHYSEMA: ICD-10-CM

## 2024-08-21 DIAGNOSIS — J30.9 ALLERGIC RHINITIS, UNSPECIFIED: ICD-10-CM

## 2024-08-21 DIAGNOSIS — R91.8 OTHER NONSPECIFIC ABNORMAL FINDING OF LUNG FIELD: ICD-10-CM

## 2024-08-21 PROCEDURE — 99214 OFFICE O/P EST MOD 30 MIN: CPT

## 2024-08-21 RX ORDER — BENZONATATE 200 MG/1
200 CAPSULE ORAL
Qty: 1 | Refills: 0 | Status: ACTIVE | COMMUNITY
Start: 2024-08-21 | End: 1900-01-01

## 2024-08-21 NOTE — HISTORY OF PRESENT ILLNESS
[Former] : former [>= 20 pack years] : >= 20 pack years [Never] : never [TextBox_4] : ~age~yo man former 2ppd smoker (quit ~30yrs ago) w/ PMH sig for extensive CAD (s/p PCI, 5+ stents), COPD, anemia, HTN, and post-nasal drip presenting with c/o progressive GONZALEZ over the past few weeks to months to approx. 1 block that has begun to worry him. Previously was able to walk several blocks without limitation, now feels dyspneic with almost minimal exertion associated with chest tightness that radiates to the left axilla and sometimes his neck and back, that is also sometimes occasionally associated with cough of clear sputa. He sometimes feels chest tightness even at rest, and after showering he is so uncomfortable he has to sit upright and get his bearings. Sx are exacerbated lying flat or on left side. Sometimes wheezes during these episodes but also says he frequently feels dizzy and with palpitations as well when very dyspneic and has described almost "passing out" when they are at their worst. Pt also adds that he sometimes has abd pain or nausea too when he cannot catch his breath. Endorses post-nasal drip as a trigger for coughing, especially when lying down at night but also sometimes during day.  He used to see a pulmonologist and describes having breathing tests done years ago when he was told he had COPD, but has not followed-up in at least 2 years. Cannot remember name of pulmonologist he previously saw. Also follows with cardiologist Dr. Guera Quintanilla ([facility]) but admits he also has not seen her in over a year. His COPD is maintained with Symbicort and PRN albuterol.   [1/27/23] Here for follow up of coughing/dyspnea and to go over PFTs/6MWT. Still gets significant coughing spells with various triggers that he cannot always predict, but include laughing, exertion, household cleaning product odors, etc. that he says really set things off leading to some chest tightness and feeling faint. Sometimes gets the chest tightness and dizziness with exertion without coughing. Had seen his cardiologist Dr. Quintanilla in the interim and was changed from Symbicort to Breo due to high copay with the former. Also was given cardiac monitor to eval for occult arrhythmias. Also had f/u TTE done at cardiology appt. Pt otherwise does not feel his symptoms are getting worse, but haven't noticed much improvement yet. He has not started using the Breo but has the inhaler with him in office today for instruction.   He also continues to experience significant reflux symptoms and PND which he definitely associates with coughing. Has not been on acid suppressive therapy a couple of months as he ran out of refills. Was only using nasal spray PRN and not every day for PND symptoms.   [4/19/23] Mr. Singh here for 3mo f/u. Over last couple of weeks been coughing more and feeling a bit more GONZALEZ -- had been doing well otherwise since last visit up until recently. Cough became productive of thick, yellowish phlegm last few days. Also has felt some fatigue. No F/C, no CP or palpitations, no wheezing. Main complaint is actually acid reflux. Says he coughs more at night when he gets reflux symptoms with heartburn up to pharnygeal area. This has been worse since he ran out of PPI for GERD and said there was an issue with his pharmacy not approving refills, asks if he can have new rx today. Has been using Breo as directed daily and PRN albuterol here and there. Also feels the nasal sprays have been helpful with PND. Saw cardiologist about a month or two ago and said some of his heart medications were adjusted. Says he is due to f/u with his primary doctor, Dr. Hillman.   [4/10/24] Recommended to f/u by primary care. Has been coughing a lot for last few weeks, mostly dry, sometimes w/ yellow or brown phlegm. Also still c/o chest pain with exertion radiating toward left arm a/w dizziness when he goes one block. Was supposed to have stress test with cardiology this week, now rescheduled for the following week as they could not get IV access for NST. Endorses PND like sx. No wheezing. Does not feel inhalers help too much. Hasn't been using Symbicort since he didn't feel much relief. Had acute visit w/ primary care 4/8, labs and CXR performed.   [5/15/24] Scheduled f/u. Still coughing, says the nasal sprays have only been nominally helpful -- may forget to do them sometimes but usually does twice a day as instructed. Still also with profound GONZALEZ a/w dizziness. Also describes claudication symptoms of lower extremities, particularly the right. Never had stress test with cardiologist as was planned when we last met in April -- says he needs to call and set it up. Had PFT a few weeks ago between appointments. No new symptoms otherwise and prior sx have not worsened.   [8/24/24] Here for f/u, again with c/o post-nasal drip induced coughing. He isn't always doing the sprays but admits they have worked in the past. Frustrated that he would have to do a lot of different tests between different doctors or take various meds. Has a lot of caregiver burden with his spouse developing dementia/Alzheimers and also admits he is also forgetful sometimes too with all of his problems. Only wheezes during significant coughing fits but then says he didn't find much relief when taking Symbicort in the past.  [TextBox_11] : 1992 [TextBox_13] : 25 [YearQuit] : 1992

## 2024-08-21 NOTE — HISTORY OF PRESENT ILLNESS
[Former] : former [>= 20 pack years] : >= 20 pack years [Never] : never [TextBox_4] : ~age~yo man former 2ppd smoker (quit ~30yrs ago) w/ PMH sig for extensive CAD (s/p PCI, 5+ stents), COPD, anemia, HTN, and post-nasal drip presenting with c/o progressive GONZALEZ over the past few weeks to months to approx. 1 block that has begun to worry him. Previously was able to walk several blocks without limitation, now feels dyspneic with almost minimal exertion associated with chest tightness that radiates to the left axilla and sometimes his neck and back, that is also sometimes occasionally associated with cough of clear sputa. He sometimes feels chest tightness even at rest, and after showering he is so uncomfortable he has to sit upright and get his bearings. Sx are exacerbated lying flat or on left side. Sometimes wheezes during these episodes but also says he frequently feels dizzy and with palpitations as well when very dyspneic and has described almost "passing out" when they are at their worst. Pt also adds that he sometimes has abd pain or nausea too when he cannot catch his breath. Endorses post-nasal drip as a trigger for coughing, especially when lying down at night but also sometimes during day.  He used to see a pulmonologist and describes having breathing tests done years ago when he was told he had COPD, but has not followed-up in at least 2 years. Cannot remember name of pulmonologist he previously saw. Also follows with cardiologist Dr. Guera Quintanilla ([facility]) but admits he also has not seen her in over a year. His COPD is maintained with Symbicort and PRN albuterol.   [1/27/23] Here for follow up of coughing/dyspnea and to go over PFTs/6MWT. Still gets significant coughing spells with various triggers that he cannot always predict, but include laughing, exertion, household cleaning product odors, etc. that he says really set things off leading to some chest tightness and feeling faint. Sometimes gets the chest tightness and dizziness with exertion without coughing. Had seen his cardiologist Dr. Quintanilla in the interim and was changed from Symbicort to Breo due to high copay with the former. Also was given cardiac monitor to eval for occult arrhythmias. Also had f/u TTE done at cardiology appt. Pt otherwise does not feel his symptoms are getting worse, but haven't noticed much improvement yet. He has not started using the Breo but has the inhaler with him in office today for instruction.   He also continues to experience significant reflux symptoms and PND which he definitely associates with coughing. Has not been on acid suppressive therapy a couple of months as he ran out of refills. Was only using nasal spray PRN and not every day for PND symptoms.   [4/19/23] Mr. Singh here for 3mo f/u. Over last couple of weeks been coughing more and feeling a bit more GONZALEZ -- had been doing well otherwise since last visit up until recently. Cough became productive of thick, yellowish phlegm last few days. Also has felt some fatigue. No F/C, no CP or palpitations, no wheezing. Main complaint is actually acid reflux. Says he coughs more at night when he gets reflux symptoms with heartburn up to pharnygeal area. This has been worse since he ran out of PPI for GERD and said there was an issue with his pharmacy not approving refills, asks if he can have new rx today. Has been using Breo as directed daily and PRN albuterol here and there. Also feels the nasal sprays have been helpful with PND. Saw cardiologist about a month or two ago and said some of his heart medications were adjusted. Says he is due to f/u with his primary doctor, Dr. Hillman.   [4/10/24] Recommended to f/u by primary care. Has been coughing a lot for last few weeks, mostly dry, sometimes w/ yellow or brown phlegm. Also still c/o chest pain with exertion radiating toward left arm a/w dizziness when he goes one block. Was supposed to have stress test with cardiology this week, now rescheduled for the following week as they could not get IV access for NST. Endorses PND like sx. No wheezing. Does not feel inhalers help too much. Hasn't been using Symbicort since he didn't feel much relief. Had acute visit w/ primary care 4/8, labs and CXR performed.   [5/15/24] Scheduled f/u. Still coughing, says the nasal sprays have only been nominally helpful -- may forget to do them sometimes but usually does twice a day as instructed. Still also with profound GONZALEZ a/w dizziness. Also describes claudication symptoms of lower extremities, particularly the right. Never had stress test with cardiologist as was planned when we last met in April -- says he needs to call and set it up. Had PFT a few weeks ago between appointments. No new symptoms otherwise and prior sx have not worsened.   [8/24/24] Here for f/u, again with c/o post-nasal drip induced coughing. He isn't always doing the sprays but admits they have worked in the past. Frustrated that he would have to do a lot of different tests between different doctors or take various meds. Has a lot of caregiver burden with his spouse developing dementia/Alzheimers and also admits he is also forgetful sometimes too with all of his problems. Only wheezes during significant coughing fits but then says he didn't find much relief when taking Symbicort in the past.  [TextBox_11] : 1992 [TextBox_13] : 25 [YearQuit] : 1992

## 2024-08-21 NOTE — REASON FOR VISIT
[Follow-Up] : a follow-up visit [Cough] : cough [Shortness of Breath] : shortness of breath [TextBox_13] : Dr. Rafia Hillman (PCP)

## 2024-08-21 NOTE — ASSESSMENT
[FreeTextEntry1] : ~age~yo man former 2ppd smoker (quit ~30yrs ago) w/ PMH sig for extensive CAD (s/p PCI, 5+ stents), ?COPD, GERD, anemia, HTN, and post-nasal drip following for coughing spells a/w progressive GONZALEZ over months  ~1 block sometimes associated w/ radiating chest tightness and occasionally near-syncope. Spirometry not showing obstructive disease, but chest imaging does indicate the presence of emphysema.   Data Reviewed: Medication reconciliation  Impression: #Centrilobular emphysema without airflow obstruction #Pulmonary nodules #Allergic rhinitis w/ post-nasal drip  Spirometry and imaging are not correlating with what appears to be persistent upper-airway cough. It is difficult to ascertain if he is consistently using one or both of INCS and azelastine daily as instructed; especially since he's demonstrated some improvement with these in the past. He has been tried on various inhaled therapies in the event some of his cough is from COPD but based on spirometry he does not have COPD phenotype (just emphysema on CT). Based on his description most of his cough triggers are from nasal drip or globus feeling of mucus in the back of his throat as opposed to originating from the lungs. I've asked him to see ENT about this in the past but he has not yet done so.   Plan: - reiterated importance of dual Flonase and Azelastine nasal sprays for PND, he never tried the addition of Neti pot irrigation like I suggested last time, he said he would try this again - re-start albuterol MDI q4-6h PRN if he has coughing fit - will continue to hold any controller inhalers as they have not helped him in the past and PFTs are not revealing airflow disease / chronic bronchitis - PRN tessalon perles at his request but I advised him these are unlikely to address the underlying issue for cough - again, I strongly suggested he have ENT evaluation and had referred him in the past; at this point I think laryngoscopic eval would probably be helpful to ensure no other etiologies of upper airway cough syndrome  - will surveil pulmonary nodules 3-6mo per Fleischner guidelines   RTO 2-3mo

## 2024-08-21 NOTE — DISCUSSION/SUMMARY
[FreeTextEntry1] : CT Chest w/o (PACS, 5/24/24) - emphysema; RLL 3mm nodule, bilateral fissural nodules up to 8mm   PFT (4/24/24) - normal wilma, volumes; mild red DLCO similar to prior PFT in 12/2022  CXR PA 1-view ([facility], 4/9/24) - no obvious consolidation or effusion  PFT (12/2022): FEV1 2.65L (106% predicted), FVC 3.44L (103% pred), FEV1/FVC 77%, TLC 93%, RV 78%, DLCO 63% - normal wilma and volumes, mild decr DLCO  6MWT (12/2022): 346 meters/360sec, pre SpO2 97% HR 70 138/77; post-SpO2 97%, HR 99 (peak 105), 160/70 no supplemental O2 required  TTE (12/2022, via cardiology note): HFpEF w/ EF 50-55%, mild dec ventricular systolic fxn, mild MR/TR, no e/o PH

## 2024-08-26 ENCOUNTER — NON-APPOINTMENT (OUTPATIENT)
Age: 74
End: 2024-08-26

## 2024-08-26 ENCOUNTER — APPOINTMENT (OUTPATIENT)
Dept: OTOLARYNGOLOGY | Facility: CLINIC | Age: 74
End: 2024-08-26
Payer: MEDICARE

## 2024-08-26 ENCOUNTER — APPOINTMENT (OUTPATIENT)
Dept: CARDIOLOGY | Facility: CLINIC | Age: 74
End: 2024-08-26
Payer: MEDICARE

## 2024-08-26 VITALS
BODY MASS INDEX: 29.82 KG/M2 | WEIGHT: 190 LBS | DIASTOLIC BLOOD PRESSURE: 71 MMHG | SYSTOLIC BLOOD PRESSURE: 120 MMHG | RESPIRATION RATE: 15 BRPM | OXYGEN SATURATION: 98 % | HEART RATE: 57 BPM | TEMPERATURE: 98 F | HEIGHT: 67 IN

## 2024-08-26 VITALS
HEIGHT: 67 IN | RESPIRATION RATE: 15 BRPM | BODY MASS INDEX: 29.82 KG/M2 | OXYGEN SATURATION: 98 % | TEMPERATURE: 98.2 F | WEIGHT: 190 LBS | SYSTOLIC BLOOD PRESSURE: 114 MMHG | HEART RATE: 63 BPM | DIASTOLIC BLOOD PRESSURE: 60 MMHG

## 2024-08-26 DIAGNOSIS — R06.02 SHORTNESS OF BREATH: ICD-10-CM

## 2024-08-26 DIAGNOSIS — I25.10 ATHEROSCLEROTIC HEART DISEASE OF NATIVE CORONARY ARTERY W/OUT ANGINA PECTORIS: ICD-10-CM

## 2024-08-26 DIAGNOSIS — E78.2 MIXED HYPERLIPIDEMIA: ICD-10-CM

## 2024-08-26 DIAGNOSIS — R07.89 OTHER CHEST PAIN: ICD-10-CM

## 2024-08-26 DIAGNOSIS — K21.9 GASTRO-ESOPHAGEAL REFLUX DISEASE W/OUT ESOPHAGITIS: ICD-10-CM

## 2024-08-26 DIAGNOSIS — I10 ESSENTIAL (PRIMARY) HYPERTENSION: ICD-10-CM

## 2024-08-26 DIAGNOSIS — Z80.0 FAMILY HISTORY OF MALIGNANT NEOPLASM OF DIGESTIVE ORGANS: ICD-10-CM

## 2024-08-26 PROCEDURE — 31575 DIAGNOSTIC LARYNGOSCOPY: CPT

## 2024-08-26 PROCEDURE — 93000 ELECTROCARDIOGRAM COMPLETE: CPT

## 2024-08-26 PROCEDURE — 99204 OFFICE O/P NEW MOD 45 MIN: CPT | Mod: 25

## 2024-08-26 RX ORDER — FAMOTIDINE 40 MG/1
40 TABLET, FILM COATED ORAL
Qty: 30 | Refills: 3 | Status: ACTIVE | COMMUNITY
Start: 2024-08-26 | End: 1900-01-01

## 2024-08-26 NOTE — DISCUSSION/SUMMARY
[FreeTextEntry1] : In a summary Mani Hines is an- elderly- male with CAD s/p stents, recent one few months ago, continue Aspirin and Plavix. Chronic chest tightness and shortness of breath, bring all the cardiac reports from previous cardiologist. Hypertension, controlled. Continue Amlodipine and Metoprolol. Hypercholesterolemia, continue Atorvastatin and low cholesterol diet. LDL goal less than 70 g/dL. Diabetes, on medications. Dizziness, avoid hot and prolonged showers. Drink plenty of water before shower. If no improvement will reevaluate. Monitor BP at home. Follow up in 3 months.

## 2024-08-26 NOTE — HISTORY OF PRESENT ILLNESS
[FreeTextEntry1] : Mani Hines is a 74- year- old male with CAD s/p stents (6 stents) since 2022 and recent one few months ago at [facility], hypertension, hypercholesterolemia, Diabetes and COPD comes for cardiac evaluation. Used to follow up with different Cardiologist and now switched to [employer] ([employer]). Complaining of chromic mild chest tightness and shortness of breath on exertion which is relieved with rest. Had Cardiac work up done few months ago by previous Cardiologist and had stent done. Seen by Pulmonary. Also complaining of chronic cough and feels dizzy when he coughs a lot, laughs and turns head quickly. Also complaining of dizziness and presyncope after showers. Compliant to medications.

## 2024-08-26 NOTE — REVIEW OF SYSTEMS
[Dyspnea on exertion] : dyspnea during exertion [Chest Discomfort] : chest discomfort [Cough] : cough [Joint Pain] : joint pain [Dizziness] : dizziness [Negative] : Constitutional [Blurry Vision] : no blurred vision [Lower Ext Edema] : no extremity edema [Palpitations] : no palpitations [Orthopnea] : no orthopnea [PND] : no PND [Wheezing] : no wheezing [Coughing Up Blood] : no hemoptysis [Snoring] : no snoring [Abdominal Pain] : no abdominal pain [Nausea] : no nausea [Vomiting] : no vomiting [Heartburn] : no heartburn [Rash] : no rash [Itching] : no itching [Tremor] : no tremor was seen [Numbness (Hypoesthesia)] : no numbness [Tingling (Paresthesia)] : no tingling [Confusion] : no confusion was observed [Anxiety] : no anxiety [Under Stress] : not under stress [Easy Bleeding] : no tendency for easy bleeding [Easy Bruising] : no tendency for easy bruising

## 2024-08-27 NOTE — ASSESSMENT
[FreeTextEntry1] : 74 year old male referred by Dr. Rocha for chronic cough  - Signs and symptoms consistent with GERD---Postcricoid edema noted on fiberoptic exam, sonya related to LPR  --Discussed possible etiologies at length including laryngopharyngeal reflux --Educated on diet modifications to minimize burning with swallowing, decrease or eliminate acidic foods --Continue Omeprazole daily 30 min before largest meal and will add Pepcid before bedtime  --Follow up in 4 weeks for re-evaluation. --He is in agreement with this plan

## 2024-08-27 NOTE — HISTORY OF PRESENT ILLNESS
[de-identified] : 74 year old male referred by Dr. Rocha presents for evaluation of chronic cough Pt reports dizzy and lightheadedness when coughs or laughs, he has seen cards and pulm  and they have been working him up. He also reports excessive thick mucus in throat  throughout night.  Takes omeprazole at night  Pt denies dysphonia, dysphagia, dyspnea, pain, recent fevers or unintentional weight loss.  HX smoking quit 30 years ago, smoked for 20 years~1ppd and Denies ETOH use

## 2024-08-27 NOTE — HISTORY OF PRESENT ILLNESS
[de-identified] : 74 year old male referred by Dr. Rocha presents for evaluation of chronic cough Pt reports dizzy and lightheadedness when coughs or laughs, he has seen cards and pulm  and they have been working him up. He also reports excessive thick mucus in throat  throughout night.  Takes omeprazole at night  Pt denies dysphonia, dysphagia, dyspnea, pain, recent fevers or unintentional weight loss.  HX smoking quit 30 years ago, smoked for 20 years~1ppd and Denies ETOH use

## 2024-09-17 NOTE — HISTORY OF PRESENT ILLNESS
[de-identified] : 74 year old male referred by Dr. Rocha presents for follow up of chronic cough At last office visit his fiberoptic exam demonstrated  postcricoid edema noted on fiberoptic exam, sonya related to LPR.  Diet and lifestyle modifycations were discussed, omeprazole continued and Pepcid was  at nighht time.   He reports xxxx in synmptoms   Pt reports dizzy and lightheadedness when coughs or laughs, he has seen cards and pulm  and they have been working him up. He also reports excessive thick mucus in throat  throughout night.  Takes omeprazole at night  Pt denies dysphonia, dysphagia, dyspnea, pain, recent fevers or unintentional weight loss.  HX smoking quit 30 years ago, smoked for 20 years~1ppd and Denies ETOH use

## 2024-09-17 NOTE — ASSESSMENT
[FreeTextEntry1] : 74 year old male referred by Dr. Rocha presents for follow up of chronic cough   -At last office visit his fiberoptic exam demonstrated  postcricoid edema noted on fiberoptic exam, sonya related to LPR.     --Rediscussed possible etiologies at length including laryngopharyngeal reflux --Reeducated on diet modifications to minimize burning with swallowing, decrease or eliminate acidic foods --Continue Omeprazole daily 30 min before largest meal and will add Pepcid before bedtime  --Follow up in 4 weeks for re-evaluation. --He is in agreement with this plan

## 2024-09-23 ENCOUNTER — APPOINTMENT (OUTPATIENT)
Dept: OTOLARYNGOLOGY | Facility: CLINIC | Age: 74
End: 2024-09-23

## 2024-10-07 ENCOUNTER — APPOINTMENT (OUTPATIENT)
Dept: RHEUMATOLOGY | Facility: CLINIC | Age: 74
End: 2024-10-07
Payer: MEDICARE

## 2024-10-07 VITALS
OXYGEN SATURATION: 99 % | HEIGHT: 67 IN | BODY MASS INDEX: 29.82 KG/M2 | HEART RATE: 69 BPM | WEIGHT: 190 LBS | DIASTOLIC BLOOD PRESSURE: 68 MMHG | TEMPERATURE: 97.5 F | SYSTOLIC BLOOD PRESSURE: 138 MMHG

## 2024-10-07 DIAGNOSIS — R76.8 OTHER SPECIFIED ABNORMAL IMMUNOLOGICAL FINDINGS IN SERUM: ICD-10-CM

## 2024-10-07 DIAGNOSIS — M25.642 STIFFNESS OF RIGHT HAND, NOT ELSEWHERE CLASSIFIED: ICD-10-CM

## 2024-10-07 DIAGNOSIS — M25.641 STIFFNESS OF RIGHT HAND, NOT ELSEWHERE CLASSIFIED: ICD-10-CM

## 2024-10-07 DIAGNOSIS — R21 RASH AND OTHER NONSPECIFIC SKIN ERUPTION: ICD-10-CM

## 2024-10-07 PROCEDURE — G2211 COMPLEX E/M VISIT ADD ON: CPT

## 2024-10-07 PROCEDURE — 99205 OFFICE O/P NEW HI 60 MIN: CPT

## 2024-10-08 ENCOUNTER — LABORATORY RESULT (OUTPATIENT)
Age: 74
End: 2024-10-08

## 2024-10-14 DIAGNOSIS — M25.551 PAIN IN RIGHT HIP: ICD-10-CM

## 2024-10-14 DIAGNOSIS — M25.552 PAIN IN RIGHT HIP: ICD-10-CM

## 2024-10-14 LAB
ALBUMIN MFR SERPL ELPH: 59.9 %
ALBUMIN SERPL ELPH-MCNC: 4.4 G/DL
ALBUMIN SERPL-MCNC: 4 G/DL
ALBUMIN/GLOB SERPL: 1.5 RATIO
ALP BLD-CCNC: 68 U/L
ALPHA1 GLOB MFR SERPL ELPH: 4.3 %
ALPHA1 GLOB SERPL ELPH-MCNC: 0.3 G/DL
ALPHA2 GLOB MFR SERPL ELPH: 9.7 %
ALPHA2 GLOB SERPL ELPH-MCNC: 0.6 G/DL
ALT SERPL-CCNC: 11 U/L
ANION GAP SERPL CALC-SCNC: 16 MMOL/L
APPEARANCE: CLEAR
AST SERPL-CCNC: 19 U/L
B-GLOBULIN MFR SERPL ELPH: 12.4 %
B-GLOBULIN SERPL ELPH-MCNC: 0.8 G/DL
BILIRUB SERPL-MCNC: 0.8 MG/DL
BILIRUBIN URINE: NEGATIVE
BLOOD URINE: NEGATIVE
BUN SERPL-MCNC: 13 MG/DL
C3 SERPL-MCNC: 178 MG/DL
C4 SERPL-MCNC: 37 MG/DL
CALCIUM SERPL-MCNC: 8.9 MG/DL
CCP AB SER IA-ACNC: <8 U/ML
CENTROMERE IGG SER-ACNC: <0.2 AL
CHLORIDE SERPL-SCNC: 105 MMOL/L
CO2 SERPL-SCNC: 20 MMOL/L
COLOR: YELLOW
CREAT SERPL-MCNC: 1.08 MG/DL
CREAT SPEC-SCNC: 254 MG/DL
CREAT/PROT UR: 0.1 RATIO
DSDNA AB SER-ACNC: <1 IU/ML
EGFR: 72 ML/MIN/1.73M2
ENA RNP AB SER IA-ACNC: <0.2 AL
ENA SCL70 IGG SER IA-ACNC: <0.2 AL
ENA SM AB SER IA-ACNC: <0.2 AL
ENA SS-A AB SER IA-ACNC: <0.2 AL
ENA SS-B AB SER IA-ACNC: <0.2 AL
G6PD SER-CCNC: 12.5 U/G HGB
GAMMA GLOB FLD ELPH-MCNC: 0.9 G/DL
GAMMA GLOB MFR SERPL ELPH: 13.7 %
GLUCOSE QUALITATIVE U: NEGATIVE MG/DL
GLUCOSE SERPL-MCNC: 97 MG/DL
HLA-B27 QL NAA+PROBE: NORMAL
INTERPRETATION SERPL IEP-IMP: NORMAL
KETONES URINE: NEGATIVE MG/DL
LEUKOCYTE ESTERASE URINE: ABNORMAL
NITRITE URINE: NEGATIVE
PH URINE: 5.5
POTASSIUM SERPL-SCNC: 4.3 MMOL/L
PROT SERPL-MCNC: 6.7 G/DL
PROT SERPL-MCNC: 6.7 G/DL
PROT SERPL-MCNC: 7.1 G/DL
PROT UR-MCNC: 13 MG/DL
PROTEIN URINE: NEGATIVE MG/DL
RF+CCP IGG SER-IMP: NEGATIVE
RHEUMATOID FACT SER QL: 12 IU/ML
SODIUM SERPL-SCNC: 141 MMOL/L
SPECIFIC GRAVITY URINE: 1.02
UROBILINOGEN URINE: 0.2 MG/DL

## 2024-10-29 PROBLEM — M13.0 POLYARTHROPATHY: Status: ACTIVE | Noted: 2024-10-29

## 2024-10-29 PROBLEM — M25.50 POLYARTHRALGIA: Status: ACTIVE | Noted: 2024-10-29

## 2024-11-04 ENCOUNTER — APPOINTMENT (OUTPATIENT)
Dept: RHEUMATOLOGY | Facility: CLINIC | Age: 74
End: 2024-11-04

## 2024-11-05 ENCOUNTER — APPOINTMENT (OUTPATIENT)
Dept: FAMILY MEDICINE | Facility: CLINIC | Age: 74
End: 2024-11-05
Payer: MEDICARE

## 2024-11-05 VITALS
SYSTOLIC BLOOD PRESSURE: 125 MMHG | BODY MASS INDEX: 29.82 KG/M2 | HEIGHT: 67 IN | WEIGHT: 190 LBS | HEART RATE: 85 BPM | DIASTOLIC BLOOD PRESSURE: 67 MMHG | OXYGEN SATURATION: 96 % | TEMPERATURE: 98.2 F

## 2024-11-05 DIAGNOSIS — M25.50 PAIN IN UNSPECIFIED JOINT: ICD-10-CM

## 2024-11-05 DIAGNOSIS — I10 ESSENTIAL (PRIMARY) HYPERTENSION: ICD-10-CM

## 2024-11-05 DIAGNOSIS — M13.0 POLYARTHRITIS, UNSPECIFIED: ICD-10-CM

## 2024-11-05 DIAGNOSIS — E78.2 MIXED HYPERLIPIDEMIA: ICD-10-CM

## 2024-11-05 DIAGNOSIS — K21.9 GASTRO-ESOPHAGEAL REFLUX DISEASE W/OUT ESOPHAGITIS: ICD-10-CM

## 2024-11-05 DIAGNOSIS — D50.9 IRON DEFICIENCY ANEMIA, UNSPECIFIED: ICD-10-CM

## 2024-11-05 DIAGNOSIS — E55.9 VITAMIN D DEFICIENCY, UNSPECIFIED: ICD-10-CM

## 2024-11-05 DIAGNOSIS — E11.9 TYPE 2 DIABETES MELLITUS W/OUT COMPLICATIONS: ICD-10-CM

## 2024-11-05 DIAGNOSIS — J44.9 CHRONIC OBSTRUCTIVE PULMONARY DISEASE, UNSPECIFIED: ICD-10-CM

## 2024-11-05 DIAGNOSIS — Z23 ENCOUNTER FOR IMMUNIZATION: ICD-10-CM

## 2024-11-05 PROCEDURE — 90662 IIV NO PRSV INCREASED AG IM: CPT

## 2024-11-05 PROCEDURE — 99214 OFFICE O/P EST MOD 30 MIN: CPT | Mod: 25

## 2024-11-05 PROCEDURE — G0008: CPT

## 2024-11-08 ENCOUNTER — RX RENEWAL (OUTPATIENT)
Age: 74
End: 2024-11-08

## 2024-11-15 ENCOUNTER — NON-APPOINTMENT (OUTPATIENT)
Age: 74
End: 2024-11-15

## 2024-11-18 ENCOUNTER — APPOINTMENT (OUTPATIENT)
Dept: CARDIOLOGY | Facility: CLINIC | Age: 74
End: 2024-11-18
Payer: MEDICARE

## 2024-11-18 VITALS
RESPIRATION RATE: 15 BRPM | DIASTOLIC BLOOD PRESSURE: 70 MMHG | HEART RATE: 84 BPM | OXYGEN SATURATION: 97 % | WEIGHT: 192 LBS | TEMPERATURE: 98 F | HEIGHT: 67 IN | BODY MASS INDEX: 30.13 KG/M2 | SYSTOLIC BLOOD PRESSURE: 124 MMHG

## 2024-11-18 DIAGNOSIS — I25.10 ATHEROSCLEROTIC HEART DISEASE OF NATIVE CORONARY ARTERY W/OUT ANGINA PECTORIS: ICD-10-CM

## 2024-11-18 DIAGNOSIS — I10 ESSENTIAL (PRIMARY) HYPERTENSION: ICD-10-CM

## 2024-11-18 DIAGNOSIS — R06.02 SHORTNESS OF BREATH: ICD-10-CM

## 2024-11-18 DIAGNOSIS — E78.2 MIXED HYPERLIPIDEMIA: ICD-10-CM

## 2024-11-18 PROCEDURE — 93306 TTE W/DOPPLER COMPLETE: CPT

## 2024-11-18 PROCEDURE — 99214 OFFICE O/P EST MOD 30 MIN: CPT | Mod: 25

## 2024-11-21 ENCOUNTER — APPOINTMENT (OUTPATIENT)
Dept: FAMILY MEDICINE | Facility: CLINIC | Age: 74
End: 2024-11-21
Payer: MEDICARE

## 2024-11-21 PROCEDURE — 36415 COLL VENOUS BLD VENIPUNCTURE: CPT

## 2024-12-17 ENCOUNTER — APPOINTMENT (OUTPATIENT)
Dept: RHEUMATOLOGY | Facility: CLINIC | Age: 74
End: 2024-12-17

## 2024-12-20 ENCOUNTER — APPOINTMENT (OUTPATIENT)
Dept: FAMILY MEDICINE | Facility: CLINIC | Age: 74
End: 2024-12-20
Payer: MEDICARE

## 2024-12-20 VITALS
HEART RATE: 60 BPM | SYSTOLIC BLOOD PRESSURE: 138 MMHG | BODY MASS INDEX: 30.29 KG/M2 | DIASTOLIC BLOOD PRESSURE: 74 MMHG | TEMPERATURE: 98.3 F | WEIGHT: 193 LBS | HEIGHT: 67 IN | OXYGEN SATURATION: 98 %

## 2024-12-20 DIAGNOSIS — M54.2 CERVICALGIA: ICD-10-CM

## 2024-12-20 DIAGNOSIS — E11.9 TYPE 2 DIABETES MELLITUS W/OUT COMPLICATIONS: ICD-10-CM

## 2024-12-20 DIAGNOSIS — E78.2 MIXED HYPERLIPIDEMIA: ICD-10-CM

## 2024-12-20 DIAGNOSIS — M54.50 LOW BACK PAIN, UNSPECIFIED: ICD-10-CM

## 2024-12-20 DIAGNOSIS — D50.9 IRON DEFICIENCY ANEMIA, UNSPECIFIED: ICD-10-CM

## 2024-12-20 DIAGNOSIS — E55.9 VITAMIN D DEFICIENCY, UNSPECIFIED: ICD-10-CM

## 2024-12-20 PROCEDURE — G2211 COMPLEX E/M VISIT ADD ON: CPT

## 2024-12-20 PROCEDURE — 99214 OFFICE O/P EST MOD 30 MIN: CPT

## 2024-12-20 RX ORDER — CHLORHEXIDINE GLUCONATE 4 %
325 (65 FE) LIQUID (ML) TOPICAL DAILY
Qty: 90 | Refills: 1 | Status: ACTIVE | COMMUNITY
Start: 2024-12-20 | End: 1900-01-01

## 2024-12-26 ENCOUNTER — APPOINTMENT (OUTPATIENT)
Dept: ORTHOPEDIC SURGERY | Facility: CLINIC | Age: 74
End: 2024-12-26
Payer: MEDICARE

## 2024-12-26 DIAGNOSIS — M54.16 RADICULOPATHY, LUMBAR REGION: ICD-10-CM

## 2024-12-26 DIAGNOSIS — M54.12 RADICULOPATHY, CERVICAL REGION: ICD-10-CM

## 2024-12-26 PROCEDURE — 72110 X-RAY EXAM L-2 SPINE 4/>VWS: CPT

## 2024-12-26 PROCEDURE — 72040 X-RAY EXAM NECK SPINE 2-3 VW: CPT

## 2024-12-26 PROCEDURE — 99203 OFFICE O/P NEW LOW 30 MIN: CPT | Mod: 25

## 2025-01-02 RX ORDER — MELOXICAM 15 MG/1
15 TABLET ORAL DAILY
Qty: 20 | Refills: 0 | Status: ACTIVE | COMMUNITY
Start: 2025-01-02 | End: 1900-01-01

## 2025-01-09 ENCOUNTER — APPOINTMENT (OUTPATIENT)
Dept: FAMILY MEDICINE | Facility: CLINIC | Age: 75
End: 2025-01-09
Payer: MEDICARE

## 2025-01-09 DIAGNOSIS — M54.16 RADICULOPATHY, LUMBAR REGION: ICD-10-CM

## 2025-01-09 DIAGNOSIS — M54.12 RADICULOPATHY, CERVICAL REGION: ICD-10-CM

## 2025-01-09 DIAGNOSIS — M54.2 CERVICALGIA: ICD-10-CM

## 2025-01-09 DIAGNOSIS — I10 ESSENTIAL (PRIMARY) HYPERTENSION: ICD-10-CM

## 2025-01-09 DIAGNOSIS — M54.50 LOW BACK PAIN, UNSPECIFIED: ICD-10-CM

## 2025-01-09 PROCEDURE — 99214 OFFICE O/P EST MOD 30 MIN: CPT

## 2025-05-28 ENCOUNTER — NON-APPOINTMENT (OUTPATIENT)
Age: 75
End: 2025-05-28

## 2025-05-28 ENCOUNTER — APPOINTMENT (OUTPATIENT)
Dept: CARDIOLOGY | Facility: CLINIC | Age: 75
End: 2025-05-28
Payer: MEDICARE

## 2025-05-28 VITALS
HEIGHT: 67 IN | BODY MASS INDEX: 29.51 KG/M2 | SYSTOLIC BLOOD PRESSURE: 145 MMHG | TEMPERATURE: 98 F | WEIGHT: 188 LBS | HEART RATE: 93 BPM | DIASTOLIC BLOOD PRESSURE: 75 MMHG | RESPIRATION RATE: 16 BRPM | OXYGEN SATURATION: 99 %

## 2025-05-28 VITALS — DIASTOLIC BLOOD PRESSURE: 74 MMHG | SYSTOLIC BLOOD PRESSURE: 138 MMHG

## 2025-05-28 VITALS — SYSTOLIC BLOOD PRESSURE: 150 MMHG | DIASTOLIC BLOOD PRESSURE: 125 MMHG

## 2025-05-28 DIAGNOSIS — I25.10 ATHEROSCLEROTIC HEART DISEASE OF NATIVE CORONARY ARTERY W/OUT ANGINA PECTORIS: ICD-10-CM

## 2025-05-28 DIAGNOSIS — I10 ESSENTIAL (PRIMARY) HYPERTENSION: ICD-10-CM

## 2025-05-28 DIAGNOSIS — E78.2 MIXED HYPERLIPIDEMIA: ICD-10-CM

## 2025-05-28 PROCEDURE — 93000 ELECTROCARDIOGRAM COMPLETE: CPT

## 2025-05-28 PROCEDURE — 99214 OFFICE O/P EST MOD 30 MIN: CPT | Mod: 25

## (undated) DEVICE — CLAMP BX HOT RAD JAW 3

## (undated) DEVICE — NDL INJ SCLERO INTERJECT 23G

## (undated) DEVICE — DRSG CURITY GAUZE SPONGE 4 X 4" 12-PLY

## (undated) DEVICE — LUBRICATING JELLY ONESHOT 1.25OZ

## (undated) DEVICE — FORCEP BIOPSY 2.5MM DISP

## (undated) DEVICE — ADAPTER ENDO CHNL SINGLE USE

## (undated) DEVICE — SENSOR O2 FINGER ADULT

## (undated) DEVICE — TUBING ENDO EXT OLYMPUS 160 24HR USE GI

## (undated) DEVICE — CATH ELCTR GLIDE PRB 7FR

## (undated) DEVICE — BITE BLOCK ADULT 20 X 27MM (GREEN)

## (undated) DEVICE — STERIS DEFENDO 3-PIECE KIT (AIR/WATER, SUCTION & BIOPSY VALVES)

## (undated) DEVICE — KIT ENDO PROCEDURE CUST W/VLV

## (undated) DEVICE — FORCEP RADIAL JAW 4 W NDL 2.2MM 2.8MM 240CM ORANGE DISP

## (undated) DEVICE — SNARE LOOP POLY DISP 30MM LOOP

## (undated) DEVICE — RETRIEVER ROTH NET PLATINUM-UNIVERSAL

## (undated) DEVICE — SOLIDIFIER ISOLYZER 2000 CC

## (undated) DEVICE — TUBING CANNULA SALTER LABS NASAL ADULT 7FT

## (undated) DEVICE — Device

## (undated) DEVICE — SYR LUER LOK 50CC

## (undated) DEVICE — TUBING MEDI-VAC W MAXIGRIP CONNECTORS 1/4"X6'

## (undated) DEVICE — SOL INJ NS 0.9% 500ML 1-PORT

## (undated) DEVICE — TUBING IV SET GRAVITY 3Y 100" MACRO